# Patient Record
Sex: FEMALE | Race: WHITE | NOT HISPANIC OR LATINO | Employment: FULL TIME | RURAL
[De-identification: names, ages, dates, MRNs, and addresses within clinical notes are randomized per-mention and may not be internally consistent; named-entity substitution may affect disease eponyms.]

---

## 2017-01-25 ENCOUNTER — HISTORICAL (OUTPATIENT)
Dept: ADMINISTRATIVE | Facility: HOSPITAL | Age: 22
End: 2017-01-25

## 2017-01-27 LAB
LAB AP CLINICAL INFORMATION: NORMAL
LAB AP DIAGNOSIS - HISTORICAL: NORMAL
LAB AP GROSS PATHOLOGY - HISTORICAL: NORMAL
LAB AP SPECIMEN SUBMITTED - HISTORICAL: NORMAL

## 2018-04-23 ENCOUNTER — HISTORICAL (OUTPATIENT)
Dept: ADMINISTRATIVE | Facility: HOSPITAL | Age: 23
End: 2018-04-23

## 2018-04-25 LAB
LAB AP COMMENTS: NORMAL
LAB AP GENERAL CAT - HISTORICAL: NORMAL
LAB AP INTERPRETATION/RESULT - HISTORICAL: NORMAL
LAB AP SPECIMEN ADEQUACY - HISTORICAL: NORMAL
LAB AP SPECIMEN SUBMITTED - HISTORICAL: NORMAL

## 2023-12-08 ENCOUNTER — OFFICE VISIT (OUTPATIENT)
Dept: FAMILY MEDICINE | Facility: CLINIC | Age: 28
End: 2023-12-08
Payer: COMMERCIAL

## 2023-12-08 VITALS
BODY MASS INDEX: 32.32 KG/M2 | TEMPERATURE: 98 F | SYSTOLIC BLOOD PRESSURE: 116 MMHG | DIASTOLIC BLOOD PRESSURE: 64 MMHG | RESPIRATION RATE: 20 BRPM | HEART RATE: 98 BPM | OXYGEN SATURATION: 99 % | HEIGHT: 65 IN | WEIGHT: 194 LBS

## 2023-12-08 DIAGNOSIS — H65.02 NON-RECURRENT ACUTE SEROUS OTITIS MEDIA OF LEFT EAR: ICD-10-CM

## 2023-12-08 DIAGNOSIS — R59.1 LYMPHADENOPATHY OF HEAD AND NECK: Primary | ICD-10-CM

## 2023-12-08 PROCEDURE — 99202 PR OFFICE/OUTPT VISIT, NEW, LEVL II, 15-29 MIN: ICD-10-PCS | Mod: ,,,

## 2023-12-08 PROCEDURE — 99202 OFFICE O/P NEW SF 15 MIN: CPT | Mod: ,,,

## 2023-12-08 RX ORDER — VALACYCLOVIR HYDROCHLORIDE 1 G/1
1000 TABLET, FILM COATED ORAL 2 TIMES DAILY
COMMUNITY
Start: 2023-11-02

## 2023-12-08 NOTE — PROGRESS NOTES
Subjective     Patient ID: Cristal Smith is a 28 y.o. female.    Chief Complaint: Lymph Node Metastasis (Lymph node is located behind her ear closer to her neck on the left side, it is giving her pain. Started 3 days ago)    CHOCO is a 28 year old female that presents today for complaints of a swollen lymph node behind her left ear/neck. She states she noticed they lymph node 3 days ago. The lymph node is tender to touch. She has a history of genital HSV. She admits that she has had some oral lesions over the last couple of weeks. She has taken valtrex. She denies oral lesions at present. She denies sore throat, fever, bilateral ear pain or fullness, cough, nasal congestion, or sinus pressure. She denies other swollen lymph nodes.     Review of Systems   Constitutional:  Negative for chills, fatigue and fever.   HENT:  Negative for nasal congestion, ear pain, postnasal drip, rhinorrhea, sinus pressure/congestion, sneezing and sore throat.    Respiratory:  Negative for cough and shortness of breath.    Cardiovascular:  Negative for chest pain and palpitations.   Integumentary:  Negative for color change and pallor.   Hematological:  Positive for adenopathy.          Objective     Physical Exam  Vitals and nursing note reviewed.   Constitutional:       Appearance: Normal appearance. She is normal weight.   HENT:      Head: Normocephalic and atraumatic.      Right Ear: Tympanic membrane and ear canal normal.      Left Ear: A middle ear effusion is present.      Nose: Nose normal.      Mouth/Throat:      Mouth: Mucous membranes are moist.      Pharynx: Oropharynx is clear.   Eyes:      Extraocular Movements: Extraocular movements intact.      Conjunctiva/sclera: Conjunctivae normal.      Pupils: Pupils are equal, round, and reactive to light.   Cardiovascular:      Rate and Rhythm: Normal rate and regular rhythm.      Pulses: Normal pulses.      Heart sounds: Normal heart sounds.   Pulmonary:      Effort: Pulmonary effort  is normal.      Breath sounds: Normal breath sounds.   Musculoskeletal:         General: Normal range of motion.      Cervical back: Normal range of motion and neck supple.   Lymphadenopathy:      Head:      Left side of head: Posterior auricular adenopathy present. No submandibular adenopathy.      Cervical: Cervical adenopathy present.      Left cervical: Posterior cervical adenopathy present.      Upper Body:      Left upper body: No supraclavicular adenopathy.   Skin:     General: Skin is warm and dry.   Neurological:      General: No focal deficit present.      Mental Status: She is alert and oriented to person, place, and time.          Assessment and Plan     1. Lymphadenopathy of head and neck    2. Non-recurrent acute serous otitis media of left ear        Suspect viral etiology at this time  Increase PO fluid intake  Recommend nasal steroid and zyrtec  RTC if symptoms worsen or persist       No follow-ups on file.

## 2024-09-03 ENCOUNTER — OFFICE VISIT (OUTPATIENT)
Dept: FAMILY MEDICINE | Facility: CLINIC | Age: 29
End: 2024-09-03
Payer: COMMERCIAL

## 2024-09-03 VITALS
HEIGHT: 65 IN | WEIGHT: 182 LBS | SYSTOLIC BLOOD PRESSURE: 121 MMHG | BODY MASS INDEX: 30.32 KG/M2 | TEMPERATURE: 98 F | OXYGEN SATURATION: 100 % | HEART RATE: 78 BPM | DIASTOLIC BLOOD PRESSURE: 85 MMHG | RESPIRATION RATE: 20 BRPM

## 2024-09-03 DIAGNOSIS — F32.A ANXIETY AND DEPRESSION: Primary | ICD-10-CM

## 2024-09-03 DIAGNOSIS — R11.2 NAUSEA AND VOMITING, UNSPECIFIED VOMITING TYPE: ICD-10-CM

## 2024-09-03 DIAGNOSIS — R63.8 DECREASED ORAL INTAKE: ICD-10-CM

## 2024-09-03 DIAGNOSIS — F41.9 ANXIETY AND DEPRESSION: Primary | ICD-10-CM

## 2024-09-03 DIAGNOSIS — Z11.3 SCREEN FOR STD (SEXUALLY TRANSMITTED DISEASE): ICD-10-CM

## 2024-09-03 PROBLEM — Z02.9 ENCOUNTER FOR ADMINISTRATIVE EXAMINATIONS, UNSPECIFIED: Status: ACTIVE | Noted: 2024-08-27

## 2024-09-03 LAB
ALBUMIN SERPL BCP-MCNC: 4.4 G/DL (ref 3.5–5)
ALBUMIN/GLOB SERPL: 1.4 {RATIO}
ALP SERPL-CCNC: 78 U/L (ref 37–98)
ALT SERPL W P-5'-P-CCNC: 21 U/L (ref 13–56)
AMYLASE SERPL-CCNC: 45 U/L (ref 25–115)
ANION GAP SERPL CALCULATED.3IONS-SCNC: 11 MMOL/L (ref 7–16)
AST SERPL W P-5'-P-CCNC: 13 U/L (ref 15–37)
B-HCG UR QL: NEGATIVE
BASOPHILS # BLD AUTO: 0.07 K/UL (ref 0–0.2)
BASOPHILS NFR BLD AUTO: 0.7 % (ref 0–1)
BILIRUB SERPL-MCNC: 0.6 MG/DL (ref ?–1.2)
BILIRUB SERPL-MCNC: ABNORMAL MG/DL
BLOOD URINE, POC: NEGATIVE
BUN SERPL-MCNC: 8 MG/DL (ref 7–18)
BUN/CREAT SERPL: 10 (ref 6–20)
CALCIUM SERPL-MCNC: 9.2 MG/DL (ref 8.5–10.1)
CHLORIDE SERPL-SCNC: 109 MMOL/L (ref 98–107)
CLARITY, POC UA: ABNORMAL
CO2 SERPL-SCNC: 24 MMOL/L (ref 21–32)
COLOR, POC UA: ABNORMAL
CREAT SERPL-MCNC: 0.81 MG/DL (ref 0.55–1.02)
CTP QC/QA: YES
DIFFERENTIAL METHOD BLD: ABNORMAL
EGFR (NO RACE VARIABLE) (RUSH/TITUS): 102 ML/MIN/1.73M2
EOSINOPHIL # BLD AUTO: 0.19 K/UL (ref 0–0.5)
EOSINOPHIL NFR BLD AUTO: 1.9 % (ref 1–4)
ERYTHROCYTE [DISTWIDTH] IN BLOOD BY AUTOMATED COUNT: 12.3 % (ref 11.5–14.5)
GLOBULIN SER-MCNC: 3.2 G/DL (ref 2–4)
GLUCOSE SERPL-MCNC: 82 MG/DL (ref 74–106)
GLUCOSE UR QL STRIP: NEGATIVE
HCT VFR BLD AUTO: 42.2 % (ref 38–47)
HGB BLD-MCNC: 14.2 G/DL (ref 12–16)
HIV 1+O+2 AB SERPL QL: NORMAL
IMM GRANULOCYTES # BLD AUTO: 0.04 K/UL (ref 0–0.04)
IMM GRANULOCYTES NFR BLD: 0.4 % (ref 0–0.4)
KETONES UR QL STRIP: NEGATIVE
LEUKOCYTE ESTERASE URINE, POC: ABNORMAL
LIPASE SERPL-CCNC: 30 U/L (ref 16–77)
LYMPHOCYTES # BLD AUTO: 2.47 K/UL (ref 1–4.8)
LYMPHOCYTES NFR BLD AUTO: 24.3 % (ref 27–41)
MCH RBC QN AUTO: 30.9 PG (ref 27–31)
MCHC RBC AUTO-ENTMCNC: 33.6 G/DL (ref 32–36)
MCV RBC AUTO: 91.9 FL (ref 80–96)
MONOCYTES # BLD AUTO: 0.94 K/UL (ref 0–0.8)
MONOCYTES NFR BLD AUTO: 9.2 % (ref 2–6)
MPC BLD CALC-MCNC: 12.1 FL (ref 9.4–12.4)
NEUTROPHILS # BLD AUTO: 6.46 K/UL (ref 1.8–7.7)
NEUTROPHILS NFR BLD AUTO: 63.5 % (ref 53–65)
NITRITE, POC UA: NEGATIVE
NRBC # BLD AUTO: 0 X10E3/UL
NRBC, AUTO (.00): 0 %
PH, POC UA: 7
PLATELET # BLD AUTO: 246 K/UL (ref 150–400)
POTASSIUM SERPL-SCNC: 3.8 MMOL/L (ref 3.5–5.1)
PROT SERPL-MCNC: 7.6 G/DL (ref 6.4–8.2)
PROTEIN, POC: NEGATIVE
RBC # BLD AUTO: 4.59 M/UL (ref 4.2–5.4)
SODIUM SERPL-SCNC: 140 MMOL/L (ref 136–145)
SPECIFIC GRAVITY, POC UA: 1.02
SYPHILIS AB INTERPRETATION: NORMAL
UROBILINOGEN, POC UA: 1
WBC # BLD AUTO: 10.17 K/UL (ref 4.5–11)

## 2024-09-03 PROCEDURE — 99214 OFFICE O/P EST MOD 30 MIN: CPT | Mod: ,,, | Performed by: NURSE PRACTITIONER

## 2024-09-03 PROCEDURE — 85025 COMPLETE CBC W/AUTO DIFF WBC: CPT | Mod: ,,, | Performed by: CLINICAL MEDICAL LABORATORY

## 2024-09-03 PROCEDURE — 87661 TRICHOMONAS VAGINALIS AMPLIF: CPT | Mod: ,,, | Performed by: CLINICAL MEDICAL LABORATORY

## 2024-09-03 PROCEDURE — 82150 ASSAY OF AMYLASE: CPT | Mod: ,,, | Performed by: CLINICAL MEDICAL LABORATORY

## 2024-09-03 PROCEDURE — 86695 HERPES SIMPLEX TYPE 1 TEST: CPT | Mod: ,,, | Performed by: CLINICAL MEDICAL LABORATORY

## 2024-09-03 PROCEDURE — 87591 N.GONORRHOEAE DNA AMP PROB: CPT | Mod: ,,, | Performed by: CLINICAL MEDICAL LABORATORY

## 2024-09-03 PROCEDURE — 81025 URINE PREGNANCY TEST: CPT | Mod: QW,,, | Performed by: NURSE PRACTITIONER

## 2024-09-03 PROCEDURE — 81003 URINALYSIS AUTO W/O SCOPE: CPT | Mod: QW,,, | Performed by: NURSE PRACTITIONER

## 2024-09-03 PROCEDURE — 86696 HERPES SIMPLEX TYPE 2 TEST: CPT | Mod: ,,, | Performed by: CLINICAL MEDICAL LABORATORY

## 2024-09-03 PROCEDURE — 80053 COMPREHEN METABOLIC PANEL: CPT | Mod: ,,, | Performed by: CLINICAL MEDICAL LABORATORY

## 2024-09-03 PROCEDURE — 87389 HIV-1 AG W/HIV-1&-2 AB AG IA: CPT | Mod: ,,, | Performed by: CLINICAL MEDICAL LABORATORY

## 2024-09-03 PROCEDURE — 86780 TREPONEMA PALLIDUM: CPT | Mod: ,,, | Performed by: CLINICAL MEDICAL LABORATORY

## 2024-09-03 PROCEDURE — 87491 CHLMYD TRACH DNA AMP PROBE: CPT | Mod: ,,, | Performed by: CLINICAL MEDICAL LABORATORY

## 2024-09-03 PROCEDURE — 83690 ASSAY OF LIPASE: CPT | Mod: ,,, | Performed by: CLINICAL MEDICAL LABORATORY

## 2024-09-03 RX ORDER — TRIAMCINOLONE ACETONIDE 1 MG/G
CREAM TOPICAL 2 TIMES DAILY
COMMUNITY
Start: 2024-07-08

## 2024-09-03 RX ORDER — FLUOXETINE HYDROCHLORIDE 20 MG/1
20 CAPSULE ORAL DAILY
Qty: 30 CAPSULE | Refills: 11 | Status: SHIPPED | OUTPATIENT
Start: 2024-09-03 | End: 2024-09-03

## 2024-09-03 RX ORDER — PIMECROLIMUS 10 MG/G
CREAM TOPICAL 2 TIMES DAILY
COMMUNITY
Start: 2024-08-20

## 2024-09-03 RX ORDER — CLINDAMYCIN PHOSPHATE 10 UG/ML
LOTION TOPICAL
COMMUNITY
Start: 2024-05-21

## 2024-09-03 RX ORDER — HYDROXYZINE PAMOATE 25 MG/1
CAPSULE ORAL
Qty: 60 CAPSULE | Refills: 1 | Status: SHIPPED | OUTPATIENT
Start: 2024-09-03

## 2024-09-03 NOTE — PROGRESS NOTES
"Subjective:       Patient ID: Cristal Smith is a 29 y.o. female.    Chief Complaint: Nausea and Anxiety (ONSET:  POST LAST WEEK.  STATES SHE FEELS DEPRESSED.  )    Presents to clinic as above.  States boyfriend  last Monday after having a valve replace in heart. She has been upset and vomiting since then. Not really eating well. Not sleeping well. Feeling anxious and depressed. Has vomited several times per day since then. She is drinking fluids and voiding. She denies feeling suicidal or homicidal. Also wants an STD screen.    Review of Systems   Constitutional: Negative.    Respiratory: Negative.     Cardiovascular: Negative.    Gastrointestinal:  Positive for nausea and vomiting. Negative for abdominal pain.   Psychiatric/Behavioral:  Positive for depression. Negative for hallucinations, memory loss, substance abuse and suicidal ideas. The patient is nervous/anxious and has insomnia.           Reviewed family, medical, surgical, and social history.    Objective:      /85 (BP Location: Left arm, Patient Position: Sitting, BP Method: Large (Automatic))   Pulse 78   Temp 98.2 °F (36.8 °C)   Resp 20   Ht 5' 5" (1.651 m)   Wt 82.6 kg (182 lb)   LMP 2024   SpO2 100%   BMI 30.29 kg/m²   Physical Exam  Vitals and nursing note reviewed.   Constitutional:       General: She is not in acute distress.     Appearance: Normal appearance. She is not ill-appearing, toxic-appearing or diaphoretic.   HENT:      Head: Normocephalic.      Mouth/Throat:      Mouth: Mucous membranes are moist.   Cardiovascular:      Rate and Rhythm: Normal rate and regular rhythm.      Heart sounds: Normal heart sounds.   Pulmonary:      Effort: Pulmonary effort is normal.      Breath sounds: Normal breath sounds.   Abdominal:      General: Abdomen is flat. Bowel sounds are normal. There is no distension.      Palpations: There is no mass.      Tenderness: There is no abdominal tenderness. There is no right CVA tenderness, left " CVA tenderness, guarding or rebound.      Hernia: No hernia is present.   Musculoskeletal:      Cervical back: Normal range of motion and neck supple.   Skin:     General: Skin is warm and dry.      Capillary Refill: Capillary refill takes less than 2 seconds.   Neurological:      Mental Status: She is alert and oriented to person, place, and time.   Psychiatric:         Mood and Affect: Mood normal.         Behavior: Behavior normal.         Thought Content: Thought content normal.         Judgment: Judgment normal.            Office Visit on 09/03/2024   Component Date Value Ref Range Status    POC Preg Test, Ur 09/03/2024 Negative  Negative Final     Acceptable 09/03/2024 Yes   Final    Color, UA 09/03/2024 Dark Yellow   Final    Clarity, UA 09/03/2024 Slightly Cloudy   Final    Spec Grav UA 09/03/2024 1.020   Final    pH, UA 09/03/2024 7.0   Final    WBC, UA 09/03/2024 small   Final    Nitrite, UA 09/03/2024 negative   Final    Protein, POC 09/03/2024 negative   Final    Glucose, UA 09/03/2024 negative   Final    Ketones, UA 09/03/2024 negative   Final    Bilirubin, POC 09/03/2024 small   Final    Urobilinogen, UA 09/03/2024 1.0   Final    Blood, UA 09/03/2024 negative   Final      Assessment:       1. Anxiety and depression    2. Decreased oral intake    3. Nausea and vomiting, unspecified vomiting type    4. Screen for STD (sexually transmitted disease)        Plan:       Anxiety and depression  -     Discontinue: FLUoxetine 20 MG capsule; Take 1 capsule (20 mg total) by mouth once daily.  Dispense: 30 capsule; Refill: 11  -     hydrOXYzine pamoate (VISTARIL) 25 MG Cap; Take 1-2 po q 6 hours prn anxiety  Dispense: 60 capsule; Refill: 1    Decreased oral intake  -     POCT urine pregnancy  -     POCT URINALYSIS W/O SCOPE    Nausea and vomiting, unspecified vomiting type  -     CBC Auto Differential; Future; Expected date: 09/03/2024  -     Comprehensive Metabolic Panel; Future; Expected date:  09/03/2024  -     Amylase; Future; Expected date: 09/03/2024  -     Lipase; Future; Expected date: 09/03/2024    Screen for STD (sexually transmitted disease)  -     Chlamydia/GC, PCR  -     Trichomonas vaginalis by PCR  -     HIV 1/2 Ag/Ab (4th Gen); Future; Expected date: 09/03/2024  -     Syphilis Antibody with reflex to RPR; Future; Expected date: 09/03/2024  -     HSV 1 & 2, IgG; Future; Expected date: 09/03/2024    Does not want prozac. So, it was discontinued  I will call with lab results  Go to ER if vomiting does not stop  Take a dose of vistaril as soon as you pick it up from pharmacy (ASAP). One hour after taking vistaril, start sipping Gatorade slowly (1-2 tsp) every 15 min for an hour. If holds this down, you can continue to go up on intake slowly. Advance diet to bland foods when tolerating liquids (dry toast, crackers, soup, grits, mashed potatoes without gravy.   Go to ER if vomiting does not stop or new symptoms.            Risks, benefits, and side effects were discussed with the patient. All questions were answered to the fullest satisfaction of the patient, and pt verbalized understanding and agreement to treatment plan. Pt was to call with any new or worsening symptoms, or present to the ER.

## 2024-09-03 NOTE — LETTER
September 3, 2024      [facility]  [address] [place] [address]  Phone: [phone]  Fax: [phone]       Patient: Cristal Smith   YOB: 1995  Date of Visit: 09/03/2024    To Whom It May Concern:    Cole Smith  was at [facility] on 09/03/2024. The patient may return to work/school on 09/04/2024 with no restrictions. If you have any questions or concerns, or if I can be of further assistance, please do not hesitate to contact me.    Sincerely,    Brennan Little, CMA

## 2024-09-04 LAB
CHLAMYDIA BY PCR: NEGATIVE
N. GONORRHOEAE (GC) BY PCR: NEGATIVE
TRICHOMONAS NAT: NEGATIVE

## 2024-09-05 ENCOUNTER — OFFICE VISIT (OUTPATIENT)
Dept: FAMILY MEDICINE | Facility: CLINIC | Age: 29
End: 2024-09-05
Payer: COMMERCIAL

## 2024-09-05 VITALS
WEIGHT: 182 LBS | OXYGEN SATURATION: 98 % | HEART RATE: 93 BPM | HEIGHT: 65 IN | RESPIRATION RATE: 18 BRPM | TEMPERATURE: 99 F | DIASTOLIC BLOOD PRESSURE: 81 MMHG | BODY MASS INDEX: 30.32 KG/M2 | SYSTOLIC BLOOD PRESSURE: 119 MMHG

## 2024-09-05 DIAGNOSIS — R11.2 NAUSEA AND VOMITING, UNSPECIFIED VOMITING TYPE: ICD-10-CM

## 2024-09-05 DIAGNOSIS — F43.21 GRIEF: ICD-10-CM

## 2024-09-05 DIAGNOSIS — F41.9 ANXIETY: Primary | ICD-10-CM

## 2024-09-05 DIAGNOSIS — F32.A DEPRESSION, UNSPECIFIED DEPRESSION TYPE: ICD-10-CM

## 2024-09-05 LAB
HSV TYPE 1 AB IGG INDEX: 1.33
HSV TYPE 2 AB IGG INDEX: 3.78
HSV1 IGG SER QL: POSITIVE
HSV2 IGG SER QL: POSITIVE

## 2024-09-05 RX ORDER — TRAZODONE HYDROCHLORIDE 50 MG/1
50 TABLET ORAL NIGHTLY
Qty: 30 TABLET | Refills: 11 | Status: SHIPPED | OUTPATIENT
Start: 2024-09-05 | End: 2024-09-06 | Stop reason: SINTOL

## 2024-09-05 RX ORDER — ONDANSETRON 4 MG/1
8 TABLET, ORALLY DISINTEGRATING ORAL EVERY 8 HOURS PRN
Qty: 30 TABLET | Refills: 0 | Status: SHIPPED | OUTPATIENT
Start: 2024-09-05 | End: 2024-09-15

## 2024-09-05 NOTE — PROGRESS NOTES
"    Faina RODRIGUEZA  [address], [place] [address]  Phone: [phone]     Subjective     Name: Cristal Smith   YOB: 1995 (29 y.o.)  MRN: [id number]  Visit Date: 9/5/2024   Chief Complaint: Anxiety (Nausea and vomiting since her boyfriend passed away), Gastroesophageal Reflux, Depression (Does not want any antidepressants, reports that she cannot sleep. ), and Health Maintenance (Has had a patch test for her allergies )        HISTORY OF PRESENT ILLNESS:  Patient is a 30 y/o F who presents with nausea, vomiting and anxiety. Pt loss her significant other last week. She was very tearful on exam but denies suicidal ideation. Since that time she has nausea and vomiting. She has 4-12 episodes per day. She has not been able to tolerate much food. She has some mild abdominal pain. She denies any new foods, URI symptoms or pregnancy. She believes it is related to her anxiety. She endorses "shear fear", shaking and palpitations. She does not tolerate the SSRI's well. She was prescribed hydroxyzine but states that it has not been effective. Pt has been appointment with Alisha Oliveira NP at [facility] on 10/05/2024. She is requesting treatment for her anxiety until she has her appt.           PAST MEDICAL HISTORY:  Significant Diagnoses - Patient  has no past medical history on file.  Medications - Patient has a current medication list which includes the following long-term medication(s): clindamycin, triamcinolone acetonide 0.1%, and alprazolam.   Allergies - Patient is allergic to sulfa (sulfonamide antibiotics); neomycin; and latex, natural rubber.  Surgeries - Patient  has no past surgical history on file.  Family History - Patient family history is not on file.      SOCIAL HISTORY:  Tobacco - Patient  reports that she has been smoking cigarettes. She has never used smokeless tobacco.   Alcohol - Patient  reports no history of alcohol use.   Recreational Drugs - Patient  reports " "no history of drug use.       Review of Systems   Constitutional:  Negative for appetite change and fever.   HENT: Negative.     Respiratory:  Negative for shortness of breath.    Cardiovascular:  Positive for palpitations. Negative for chest pain.   Gastrointestinal:  Positive for abdominal pain, nausea and vomiting. Negative for diarrhea.   Psychiatric/Behavioral:  Positive for depressed mood and sleep disturbance. The patient is nervous/anxious.           No past medical history on file.     Review of patient's allergies indicates:   Allergen Reactions    Sulfa (sulfonamide antibiotics) Rash    Neomycin Hives    Latex, natural rubber Rash        No past surgical history on file.     No family history on file.    Current Outpatient Medications   Medication Instructions    ALPRAZolam (XANAX) 0.25 mg, Oral, 2 times daily PRN    clindamycin (CLEOCIN T) 1 % lotion Topical (Top)    hydrOXYzine pamoate (VISTARIL) 25 MG Cap Take 1-2 po q 6 hours prn anxiety    ondansetron (ZOFRAN-ODT) 8 mg, Oral, Every 8 hours PRN    pimecrolimus (ELIDEL) 1 % cream Topical (Top), 2 times daily    triamcinolone acetonide 0.1% (KENALOG) 0.1 % cream Topical (Top), 2 times daily        Objective     /81 (BP Location: Left arm, Patient Position: Sitting, BP Method: Medium (Automatic))   Pulse 93   Temp 98.6 °F (37 °C) (Oral)   Resp 18   Ht 5' 5" (1.651 m)   Wt 82.6 kg (182 lb)   LMP 08/19/2024   SpO2 98%   BMI 30.29 kg/m²     Physical Exam  Constitutional:       Appearance: Normal appearance.   HENT:      Head: Normocephalic and atraumatic.      Nose: Nose normal.      Mouth/Throat:      Mouth: Mucous membranes are moist.      Pharynx: Posterior oropharyngeal erythema present.   Eyes:      Conjunctiva/sclera: Conjunctivae normal.      Pupils: Pupils are equal, round, and reactive to light.   Cardiovascular:      Rate and Rhythm: Normal rate.      Pulses: Normal pulses.   Pulmonary:      Effort: Pulmonary effort is normal. No " respiratory distress.      Breath sounds: Normal breath sounds.   Abdominal:      General: Bowel sounds are normal.      Palpations: Abdomen is soft.      Tenderness: There is no abdominal tenderness.   Musculoskeletal:         General: Normal range of motion.   Skin:     General: Skin is warm and dry.      Capillary Refill: Capillary refill takes less than 2 seconds.   Neurological:      Mental Status: She is alert.   Psychiatric:         Mood and Affect: Affect is tearful.          All recently obtained labs have been reviewed and discussed in detail with the patient.   Assessment     1. Anxiety    2. Nausea and vomiting, unspecified vomiting type    3. Grief    4. Depression, unspecified depression type         Plan     Problem List Items Addressed This Visit          Psychiatric    Anxiety - Primary     H/o panic attacks and anxiety  Pt endorses anxiety with shear fear, palpitations and shaking  -hydroxyzine 25 mg has not resolved symptoms  -Pt did not tolerate SSRI previously  Start trazadone 50 mg, 1 tab Qhs   Follow up in 1 week   Follow up with Alisha Oliveira NP at [facility] on 10/05/2024             GI    Nausea and vomiting     Nausea and emesis, since last week. Emesis 4-12x per day  Pt denies fever, URI symptoms. She believes it is related to anxiety  -erythematous pharynx, BS+, soft abdomen  Start zofran 8 mg, 2 tabs PO Q8H PRN  Follow up in 1 week         Relevant Medications    ondansetron (ZOFRAN-ODT) 4 MG TbDL     Other Visit Diagnoses       Grief        Depression, unspecified depression type                  All orders:  Orders Placed This Encounter    ondansetron (ZOFRAN-ODT) 4 MG TbDL          Follow up in about 1 week (around 9/12/2024).    Faina RODRIGUEZA  Formerly [facility], 1928–1983

## 2024-09-06 DIAGNOSIS — F41.9 ANXIETY: Primary | ICD-10-CM

## 2024-09-06 RX ORDER — ALPRAZOLAM 0.25 MG/1
0.25 TABLET ORAL 2 TIMES DAILY PRN
Qty: 10 TABLET | Refills: 0 | Status: SHIPPED | OUTPATIENT
Start: 2024-09-06 | End: 2024-10-06

## 2024-09-06 NOTE — PROGRESS NOTES
Pt has endorsed panic attacks, anxiety with nausea & vomiting. Pt states that her panic attacks mostly wake her up at night and she is unable to resume sleeping.  Initially prescribed trazadone but it resulted in a panic attack per the patient. Inquired about multiple medications which would not interfere with her zofran. Buspar was suggested with a decreased use of zofran however the pt eats grapefruit often. Pt was later prescribed xanax 0.25 mg for her panic attacks. Trazadone is discontinued

## 2024-09-13 ENCOUNTER — OFFICE VISIT (OUTPATIENT)
Dept: FAMILY MEDICINE | Facility: CLINIC | Age: 29
End: 2024-09-13
Payer: COMMERCIAL

## 2024-09-13 VITALS
TEMPERATURE: 98 F | OXYGEN SATURATION: 96 % | HEART RATE: 109 BPM | BODY MASS INDEX: 30.16 KG/M2 | WEIGHT: 181 LBS | SYSTOLIC BLOOD PRESSURE: 107 MMHG | RESPIRATION RATE: 18 BRPM | HEIGHT: 65 IN | DIASTOLIC BLOOD PRESSURE: 68 MMHG

## 2024-09-13 DIAGNOSIS — Z72.51 HIGH RISK HETEROSEXUAL BEHAVIOR: ICD-10-CM

## 2024-09-13 DIAGNOSIS — Z13.220 ENCOUNTER FOR SCREENING FOR LIPOID DISORDERS: ICD-10-CM

## 2024-09-13 DIAGNOSIS — R11.2 NAUSEA AND VOMITING, UNSPECIFIED VOMITING TYPE: ICD-10-CM

## 2024-09-13 DIAGNOSIS — F41.9 ANXIETY: Primary | ICD-10-CM

## 2024-09-13 LAB
CHOLEST SERPL-MCNC: 135 MG/DL (ref 0–200)
CHOLEST/HDLC SERPL: 3.5 {RATIO}
HCV AB SER QL: NORMAL
HDLC SERPL-MCNC: 39 MG/DL (ref 40–60)
LDLC SERPL CALC-MCNC: 58 MG/DL
LDLC/HDLC SERPL: 1.5 {RATIO}
NONHDLC SERPL-MCNC: 96 MG/DL
TRIGL SERPL-MCNC: 189 MG/DL (ref 35–150)
VLDLC SERPL-MCNC: 38 MG/DL

## 2024-09-13 PROCEDURE — 99213 OFFICE O/P EST LOW 20 MIN: CPT | Mod: GC,,, | Performed by: FAMILY MEDICINE

## 2024-09-13 PROCEDURE — 86803 HEPATITIS C AB TEST: CPT | Mod: ,,, | Performed by: CLINICAL MEDICAL LABORATORY

## 2024-09-13 PROCEDURE — 80061 LIPID PANEL: CPT | Mod: ,,, | Performed by: CLINICAL MEDICAL LABORATORY

## 2024-09-13 NOTE — PROGRESS NOTES
Faina Ponce MD [facility]  [address], [place] [address]  Phone: [phone]     Subjective     Name: Cristal Smith   YOB: 1995 (29 y.o.)  MRN: [id number]  Visit Date: 9/13/2024   Chief Complaint: Sinus Problem (Room 4 1 week f/u re anxiety, c/o sinus drainage and hoarseness)        HISTORY OF PRESENT ILLNESS:  Patient is a 28 y/o F who presents for follow up. Pt endorsed significant anxiety, nausea and vomiting after the loss of boyfriend. Pt states that her nausea and vomiting have improved since last week. The zofran has been effective and she has decreased it TID to BID. She states her appetite has returned. She reports coughing up a piece of tissue. She has hoarseness, which was evident on exam. The significant emesis is the likely cause of her hoarseness and should improve.  Pt was initially prescribed trazadone for anxiety but  pt reported that it induced a panic attack. She was prescribed a low dose of alprazolam. Patient states the treatment is minimally effective. She has only had 1 panic attack. She states the medicine took longer than expected to work. Her appointment with [facility] is on 10/05/2024. Pt requested some STI testing due to her previous relationship. She has been counseled on safe sex practices.           PAST MEDICAL HISTORY:  Significant Diagnoses - Patient  has no past medical history on file.  Medications - Patient has a current medication list which includes the following long-term medication(s): alprazolam, clindamycin, and triamcinolone acetonide 0.1%.   Allergies - Patient is allergic to sulfa (sulfonamide antibiotics); neomycin; and latex, natural rubber.  Surgeries - Patient  has no past surgical history on file.  Family History - Patient family history is not on file.      SOCIAL HISTORY:  Tobacco - Patient  reports that she has been smoking cigarettes. She has never used smokeless tobacco.   Alcohol - Patient  reports no history of alcohol  "use.   Recreational Drugs - Patient  reports no history of drug use.       Review of Systems   Constitutional:  Negative for appetite change and fever.   HENT:  Positive for sore throat.    Respiratory:  Negative for shortness of breath.    Cardiovascular:  Positive for palpitations. Negative for chest pain.   Gastrointestinal:  Negative for abdominal pain, diarrhea, nausea and vomiting.   Psychiatric/Behavioral:  Positive for sleep disturbance. Negative for depressed mood. The patient is nervous/anxious.           History reviewed. No pertinent past medical history.     Review of patient's allergies indicates:   Allergen Reactions    Sulfa (sulfonamide antibiotics) Rash    Neomycin Hives    Latex, natural rubber Rash        History reviewed. No pertinent surgical history.     History reviewed. No pertinent family history.    Current Outpatient Medications   Medication Instructions    ALPRAZolam (XANAX) 0.25 mg, Oral, 2 times daily PRN    clindamycin (CLEOCIN T) 1 % lotion Apply topically.    pimecrolimus (ELIDEL) 1 % cream 2 times daily    triamcinolone acetonide 0.1% (KENALOG) 0.1 % cream 2 times daily        Objective     /68 (BP Location: Left arm, Patient Position: Sitting, BP Method: Medium (Automatic))   Pulse 109   Temp 98.1 °F (36.7 °C) (Oral)   Resp 18   Ht 5' 5" (1.651 m)   Wt 82.1 kg (181 lb)   LMP 08/19/2024   SpO2 96%   BMI 30.12 kg/m²     Physical Exam  Constitutional:       Appearance: Normal appearance.   HENT:      Mouth/Throat:      Pharynx: Posterior oropharyngeal erythema present.   Eyes:      Conjunctiva/sclera: Conjunctivae normal.   Cardiovascular:      Rate and Rhythm: Tachycardia present.   Pulmonary:      Effort: Pulmonary effort is normal. No respiratory distress.   Abdominal:      Palpations: Abdomen is soft.   Skin:     General: Skin is warm and dry.      Capillary Refill: Capillary refill takes less than 2 seconds.   Neurological:      Mental Status: She is alert. "   Psychiatric:         Mood and Affect: Mood normal.          All recently obtained labs have been reviewed and discussed in detail with the patient.   Assessment     1. Anxiety    2. Nausea and vomiting, unspecified vomiting type    3. Encounter for screening for lipoid disorders    4. High risk heterosexual behavior         Plan     Problem List Items Addressed This Visit          Psychiatric    Anxiety - Primary     H/o panic attacks and anxiety  Pt endorses anxiety with shear fear, palpitations and shaking  Prescribed trazadone which induced panic attack  Trazadone discontinued   Started on alprazolam 0.25 mg BID PRN-->minimal effect  Follow up in 10/15/2024  Follow up with Alisha Oliveira NP at [facility] on 10/05/2024             GI    Nausea and vomiting     Nausea and emesis resolve with zofran  .She believes it is related to anxiety  -erythematous pharynx, BS+, soft abdomen  Her need for zofran has decreased from TID to BID  Continue  zofran 8 mg, 2 tabs PO Q8H PRN  Follow up on 10/15/2024          Other Visit Diagnoses       Encounter for screening for lipoid disorders        Relevant Orders    Lipid Panel (Completed)    High risk heterosexual behavior        Relevant Orders    Hepatitis C Antibody (Completed)              All orders:  Orders Placed This Encounter    Hepatitis C Antibody    Lipid Panel          Follow up in about 1 month (around 10/15/2024).    Faina RODRIGUEZA   [facility]

## 2024-09-16 NOTE — PROGRESS NOTES
Triglycerides are borderline high. Her cholestrol are NL and her LDL is at goal. Hep C is is nonreactive

## 2024-09-16 NOTE — ASSESSMENT & PLAN NOTE
H/o panic attacks and anxiety  Pt endorses anxiety with shear fear, palpitations and shaking  Prescribed trazadone which induced panic attack  Trazadone discontinued   Started on alprazolam 0.25 mg BID PRN-->minimal effect  Follow up in 10/15/2024  Follow up with Alisha Oliveira NP at [facility] on 10/05/2024

## 2024-09-16 NOTE — ASSESSMENT & PLAN NOTE
Nausea and emesis resolve with zofran  .She believes it is related to anxiety  -erythematous pharynx, BS+, soft abdomen  Her need for zofran has decreased from TID to BID  Continue  zofran 8 mg, 2 tabs PO Q8H PRN  Follow up on 10/15/2024

## 2024-11-11 ENCOUNTER — OFFICE VISIT (OUTPATIENT)
Dept: FAMILY MEDICINE | Facility: CLINIC | Age: 29
End: 2024-11-11
Payer: COMMERCIAL

## 2024-11-11 VITALS — HEIGHT: 65 IN | BODY MASS INDEX: 29.99 KG/M2 | WEIGHT: 180 LBS

## 2024-11-11 DIAGNOSIS — N30.90 CYSTITIS: ICD-10-CM

## 2024-11-11 DIAGNOSIS — Z20.2 POSSIBLE EXPOSURE TO STI: ICD-10-CM

## 2024-11-11 DIAGNOSIS — N89.8 VAGINAL DISCHARGE: Primary | ICD-10-CM

## 2024-11-11 LAB
BILIRUB SERPL-MCNC: NEGATIVE MG/DL
BLOOD URINE, POC: NEGATIVE
CANDIDA SPECIES: POSITIVE
CLARITY, UA: CLEAR
COLOR, UA: YELLOW
GARDNERELLA: NEGATIVE
GLUCOSE UR QL STRIP: NEGATIVE
KETONES UR QL STRIP: NEGATIVE
LEUKOCYTE ESTERASE URINE, POC: ABNORMAL
NITRITE, POC UA: NEGATIVE
PH, POC UA: 6
PROTEIN, POC: NEGATIVE
SPECIFIC GRAVITY, POC UA: <=1.005
TRICHOMONAS: NEGATIVE
UROBILINOGEN, POC UA: 0.2

## 2024-11-11 PROCEDURE — 87491 CHLMYD TRACH DNA AMP PROBE: CPT | Mod: ,,, | Performed by: CLINICAL MEDICAL LABORATORY

## 2024-11-11 PROCEDURE — 87660 TRICHOMONAS VAGIN DIR PROBE: CPT | Mod: 59,,, | Performed by: CLINICAL MEDICAL LABORATORY

## 2024-11-11 PROCEDURE — 81003 URINALYSIS AUTO W/O SCOPE: CPT | Mod: QW,,, | Performed by: NURSE PRACTITIONER

## 2024-11-11 PROCEDURE — 87661 TRICHOMONAS VAGINALIS AMPLIF: CPT | Mod: ,,, | Performed by: CLINICAL MEDICAL LABORATORY

## 2024-11-11 PROCEDURE — 87480 CANDIDA DNA DIR PROBE: CPT | Mod: ,,, | Performed by: CLINICAL MEDICAL LABORATORY

## 2024-11-11 PROCEDURE — 87510 GARDNER VAG DNA DIR PROBE: CPT | Mod: ,,, | Performed by: CLINICAL MEDICAL LABORATORY

## 2024-11-11 PROCEDURE — 99213 OFFICE O/P EST LOW 20 MIN: CPT | Mod: ,,, | Performed by: NURSE PRACTITIONER

## 2024-11-11 PROCEDURE — 87591 N.GONORRHOEAE DNA AMP PROB: CPT | Mod: ,,, | Performed by: CLINICAL MEDICAL LABORATORY

## 2024-11-11 RX ORDER — NITROFURANTOIN 25; 75 MG/1; MG/1
100 CAPSULE ORAL 2 TIMES DAILY
Qty: 14 CAPSULE | Refills: 0 | Status: SHIPPED | OUTPATIENT
Start: 2024-11-11

## 2024-11-11 RX ORDER — PHENAZOPYRIDINE HYDROCHLORIDE 100 MG/1
100 TABLET, FILM COATED ORAL 3 TIMES DAILY PRN
Qty: 9 TABLET | Refills: 0 | Status: SHIPPED | OUTPATIENT
Start: 2024-11-11 | End: 2024-11-21

## 2024-11-11 RX ORDER — ALPRAZOLAM 1 MG/1
1 TABLET ORAL DAILY
COMMUNITY
Start: 2024-10-08

## 2024-11-11 NOTE — PROGRESS NOTES
[facility]  Chief Complaint      Chief Complaint   Patient presents with    Urinary Tract Infection     Burning/discomfort when urinating. Greenish discharge, foul odor. Pelvic discomfort while sitting. ONSET- x1wk       History of Present Illness      Cristal Smith is a 29 y.o. female  who presents today for reports of dysuria, pelvic pressure, and vaginal discharge with malodor. Onset of symptoms 1 week ago. The pt reports taking OTC Azo with mild relief of symptoms. Denies fever or chills.     Dysuria   This is a new problem. The current episode started in the past 7 days. The problem occurs every urination. The problem has been rapidly worsening. The quality of the pain is described as aching, burning and stabbing. The pain is at a severity of 5/10. The pain is moderate. There has been no fever. The fever has been present for Less than 1 day. She is Sexually active. There is A history of pyelonephritis. Associated symptoms include a discharge, flank pain, frequency, hesitancy, nausea, a possible pregnancy, urgency, weight loss and withholding. Pertinent negatives include no behavior changes, chills, sweats, vomiting, constipation or rash. She has tried home medications and increased fluids for the symptoms. The treatment provided no relief. Her past medical history is significant for STD. There is no history of catheterization, diabetes insipidus, diabetes mellitus, genitourinary reflux, hypertension, kidney stones, recurrent UTIs, a single kidney, urinary stasis or a urological procedure.      Past Medical History:  History reviewed. No pertinent past medical history.    Past Surgical History:   has no past surgical history on file.    Social History:  Social History     Tobacco Use    Smoking status: Every Day     Current packs/day: 1.00     Types: Cigarettes    Smokeless tobacco: Never   Substance Use Topics    Alcohol use: Never    Drug use: Never       I personally reviewed all past  "medical, surgical, and social.     Review of Systems   Constitutional:  Positive for weight loss. Negative for chills, fatigue, fever and unexpected weight change.   Respiratory:  Negative for cough and shortness of breath.    Cardiovascular:  Negative for chest pain and leg swelling.   Gastrointestinal:  Positive for nausea. Negative for abdominal pain, constipation, diarrhea and vomiting.   Genitourinary:  Positive for dysuria, flank pain, frequency, hesitancy and urgency. Negative for difficulty urinating.   Musculoskeletal:  Negative for arthralgias and myalgias.   Skin:  Negative for color change and rash.   Neurological:  Negative for dizziness, syncope, weakness and headaches.      Medications:  Outpatient Encounter Medications as of 11/11/2024   Medication Sig Dispense Refill    ALPRAZolam (XANAX) 0.25 MG tablet Take 1 tablet (0.25 mg total) by mouth 2 (two) times daily as needed for Anxiety (only for panic attacks). 10 tablet 0    ALPRAZolam (XANAX) 1 MG tablet Take 1 mg by mouth once daily.      clindamycin (CLEOCIN T) 1 % lotion Apply topically. (Patient not taking: Reported on 11/11/2024)      pimecrolimus (ELIDEL) 1 % cream Apply topically 2 (two) times daily. (Patient not taking: Reported on 11/11/2024)      triamcinolone acetonide 0.1% (KENALOG) 0.1 % cream Apply topically 2 (two) times daily. (Patient not taking: Reported on 11/11/2024)       No facility-administered encounter medications on file as of 11/11/2024.       Allergies:  Review of patient's allergies indicates:   Allergen Reactions    Sulfa (sulfonamide antibiotics) Rash    Neomycin Hives    Latex, natural rubber Rash       Health Maintenance:    There is no immunization history on file for this patient.     Health Maintenance   Topic Date Due    TETANUS VACCINE  Never done    Pap Smear  04/23/2021    Hepatitis C Screening  Completed    Lipid Panel  Completed        Physical Exam      Height and Weight  Height: 5' 5" (165.1 cm)  Weight: " 81.6 kg (180 lb)  BSA (Calculated - sq m): 1.93 sq meters  BMI (Calculated): 30  Weight in (lb) to have BMI = 25: 149.9]    Physical Exam  Constitutional:       Appearance: Normal appearance.   HENT:      Head: Normocephalic.      Mouth/Throat:      Mouth: Mucous membranes are moist.   Cardiovascular:      Rate and Rhythm: Normal rate and regular rhythm.      Pulses: Normal pulses.      Heart sounds: Normal heart sounds.   Pulmonary:      Effort: Pulmonary effort is normal. No respiratory distress.      Breath sounds: Normal breath sounds.   Abdominal:      General: There is no distension.      Tenderness: There is abdominal tenderness in the suprapubic area. There is no right CVA tenderness, left CVA tenderness, guarding or rebound.   Musculoskeletal:         General: No swelling, tenderness, deformity or signs of injury. Normal range of motion.      Cervical back: Neck supple.      Right lower leg: No edema.      Left lower leg: No edema.   Skin:     General: Skin is warm and dry.   Neurological:      Mental Status: She is alert and oriented to person, place, and time.   Psychiatric:         Mood and Affect: Mood normal.         Behavior: Behavior normal.         Thought Content: Thought content normal.         Judgment: Judgment normal.        Laboratory:  CBC:  Recent Labs   Lab 09/03/24  1305   WBC 10.17   RBC 4.59   Hemoglobin 14.2   Hematocrit 42.2   Platelet Count 246   MCV 91.9   MCH 30.9   MCHC 33.6       LIPIDS:  Recent Labs   Lab 09/13/24  1522   HDL Cholesterol 39 L   Cholesterol 135   Triglycerides 189 H   LDL Calculated 58   Cholesterol/HDL Ratio (Risk Factor) 3.5   Non-HDL 96       TSH:        A1C:        Lab Results   Component Value Date    GLU 82 09/03/2024     09/03/2024    K 3.8 09/03/2024     (H) 09/03/2024    CO2 24 09/03/2024    BUN 8 09/03/2024    CREATININE 0.81 09/03/2024    CALCIUM 9.2 09/03/2024    PROT 7.6 09/03/2024    ALBUMIN 4.4 09/03/2024    BILITOT 0.6 09/03/2024     "ALKPHOS 78 09/03/2024    AST 13 (L) 09/03/2024    ALT 21 09/03/2024    ANIONGAP 11 09/03/2024       Lab Results   Component Value Date    WBC 10.17 09/03/2024    RBC 4.59 09/03/2024    HGB 14.2 09/03/2024    HCT 42.2 09/03/2024    MCV 91.9 09/03/2024    RDW 12.3 09/03/2024     09/03/2024        Lab Results   Component Value Date    CHOL 135 09/13/2024    TRIG 189 (H) 09/13/2024    HDL 39 (L) 09/13/2024    LDLCALC 58 09/13/2024       No results found for: "TSH"    No results found for: "HGBA1C", "ESTIMATEDAVG"     No components found for: "VITAMIND"    No results found for: "PSA"    Point Of Care Testing:  WBC, UA   Date Value Ref Range Status   11/11/2024 Trace  Final     Nitrite, UA   Date Value Ref Range Status   11/11/2024 Negative  Final     Urobilinogen, UA   Date Value Ref Range Status   11/11/2024 0.2  Final     Protein, POC   Date Value Ref Range Status   11/11/2024 Negative  Final     pH, UA   Date Value Ref Range Status   11/11/2024 6.0  Final     Spec Grav UA   Date Value Ref Range Status   11/11/2024 <=1.005  Final     Ketones, UA   Date Value Ref Range Status   11/11/2024 Negative  Final     Bilirubin, POC   Date Value Ref Range Status   11/11/2024 Negative  Final     Glucose, UA   Date Value Ref Range Status   11/11/2024 Negative  Final       No results found for: "[id number]", "RAPFLUA", "RAPFLUB"      Assessment/Plan     1. Vaginal discharge  -     POCT URINALYSIS W/O SCOPE  -     Bacterial Vaginosis; Future; Expected date: 11/11/2024       Lab pending, will notify pt of results.    Return to clinic if symptoms worsen or fail to improve.    Questions answered to desired level of satisfaction    Verbalized understanding to all information and instructions provided      AXEL [facility]    "

## 2024-11-12 DIAGNOSIS — B37.31 VAGINAL CANDIDA: Primary | ICD-10-CM

## 2024-11-12 LAB
CHLAMYDIA BY PCR: NEGATIVE
N. GONORRHOEAE (GC) BY PCR: NEGATIVE
TRICHOMONAS NAT: NEGATIVE

## 2024-11-12 RX ORDER — FLUCONAZOLE 150 MG/1
150 TABLET ORAL DAILY
Qty: 1 TABLET | Refills: 0 | Status: SHIPPED | OUTPATIENT
Start: 2024-11-12 | End: 2024-11-13

## 2024-11-26 ENCOUNTER — PATIENT MESSAGE (OUTPATIENT)
Dept: ADMINISTRATIVE | Facility: HOSPITAL | Age: 29
End: 2024-11-26

## 2024-11-26 ENCOUNTER — OFFICE VISIT (OUTPATIENT)
Dept: FAMILY MEDICINE | Facility: CLINIC | Age: 29
End: 2024-11-26
Payer: COMMERCIAL

## 2024-11-26 VITALS
TEMPERATURE: 98 F | HEART RATE: 87 BPM | BODY MASS INDEX: 29.66 KG/M2 | WEIGHT: 178 LBS | OXYGEN SATURATION: 99 % | RESPIRATION RATE: 18 BRPM | HEIGHT: 65 IN | DIASTOLIC BLOOD PRESSURE: 73 MMHG | SYSTOLIC BLOOD PRESSURE: 110 MMHG

## 2024-11-26 DIAGNOSIS — R10.2 PELVIC PAIN: Primary | ICD-10-CM

## 2024-11-26 LAB
BACTERIA #/AREA URNS HPF: ABNORMAL /HPF
BILIRUB UR QL STRIP: NEGATIVE
CLARITY UR: ABNORMAL
COLOR UR: YELLOW
GLUCOSE UR STRIP-MCNC: NORMAL MG/DL
KETONES UR STRIP-SCNC: NEGATIVE MG/DL
LEUKOCYTE ESTERASE UR QL STRIP: ABNORMAL
MUCOUS, UA: ABNORMAL /LPF
NITRITE UR QL STRIP: NEGATIVE
PH UR STRIP: 7 PH UNITS
PROT UR QL STRIP: 10
RBC # UR STRIP: ABNORMAL /UL
RBC #/AREA URNS HPF: 62 /HPF
SP GR UR STRIP: 1.02
SQUAMOUS #/AREA URNS LPF: ABNORMAL /HPF
UROBILINOGEN UR STRIP-ACNC: NORMAL MG/DL
WBC #/AREA URNS HPF: 169 /HPF
WBC CLUMPS, UA: ABNORMAL /HPF

## 2024-11-26 RX ORDER — DEXTROAMPHETAMINE SACCHARATE, AMPHETAMINE ASPARTATE, DEXTROAMPHETAMINE SULFATE AND AMPHETAMINE SULFATE 5; 5; 5; 5 MG/1; MG/1; MG/1; MG/1
1 TABLET ORAL 2 TIMES DAILY
COMMUNITY
Start: 2024-10-29

## 2024-11-26 NOTE — PROGRESS NOTES
Fain[facility]  [address], [place] [address]  Phone: [phone]     Subjective     Name: Cristal Smith   YOB: 1995 (29 y.o.)  MRN: [id number]  Visit Date: 11/26/2024   Chief Complaint: Follow-up (Follow up)        HISTORY OF PRESENT ILLNESS:  Patient is a 28 y/o F who presents with chronic suprapubic pressure. Pt reports daily suprapubic pressure-like pain that is constant and worsens when she has the urge urinate. She initially though she had a UTI. She states her suprapubic pressure like pain has been worse since she had a IUD placed 3 yrs ago. She denies any fever, dysuria, hematuria, vomiting or diarrhea. She has been evaluated for STI's was found to be negative for gonorrhea, chlamydia and trichomonas. This has been an ongoing symptom for years. Pt was previously evaluated for nausea and vomiting as it related to anxiety. She is being followed by [facility] and reports an improved mood.           PAST MEDICAL HISTORY:  Significant Diagnoses - Patient  has no past medical history on file.  Medications - Patient has a current medication list which includes the following long-term medication(s): alprazolam, dextroamphetamine-amphetamine, clindamycin, and triamcinolone acetonide 0.1%.   Allergies - Patient is allergic to sulfa (sulfonamide antibiotics); neomycin; and latex, natural rubber.  Surgeries - Patient  has no past surgical history on file.  Family History - Patient family history is not on file.      SOCIAL HISTORY:  Tobacco - Patient  reports that she has been smoking cigarettes. She has never used smokeless tobacco.   Alcohol - Patient  reports no history of alcohol use.   Recreational Drugs - Patient  reports no history of drug use.       Review of Systems   Constitutional:  Negative for appetite change and fever.   HENT: Negative.  Negative for sore throat.    Respiratory:  Negative for shortness of breath.    Cardiovascular:  Negative for chest pain and  "palpitations.   Gastrointestinal:  Negative for abdominal pain, diarrhea, nausea and vomiting.   Genitourinary:  Positive for pelvic pain.          History reviewed. No pertinent past medical history.     Review of patient's allergies indicates:   Allergen Reactions    Sulfa (sulfonamide antibiotics) Rash    Neomycin Hives    Latex, natural rubber Rash        History reviewed. No pertinent surgical history.     History reviewed. No pertinent family history.    Current Outpatient Medications   Medication Instructions    ALPRAZolam (XANAX) 0.5 mg, Daily    clindamycin (CLEOCIN T) 1 % lotion Apply topically.    dextroamphetamine-amphetamine (ADDERALL) 20 mg tablet 1 tablet, 2 times daily    nitrofurantoin, macrocrystal-monohydrate, (MACROBID) 100 MG capsule 100 mg, Oral, 2 times daily    pimecrolimus (ELIDEL) 1 % cream 2 times daily    triamcinolone acetonide 0.1% (KENALOG) 0.1 % cream 2 times daily        Objective     /73 (BP Location: Right arm, Patient Position: Sitting)   Pulse 87   Temp 98 °F (36.7 °C) (Oral)   Resp 18   Ht 5' 5" (1.651 m)   Wt 80.7 kg (178 lb)   LMP 11/21/2024 (Approximate)   SpO2 99%   BMI 29.62 kg/m²     Physical Exam  Exam conducted with a chaperone present.   Constitutional:       Appearance: Normal appearance.   Eyes:      Conjunctiva/sclera: Conjunctivae normal.   Cardiovascular:      Rate and Rhythm: Normal rate and regular rhythm.   Pulmonary:      Effort: Pulmonary effort is normal.      Breath sounds: Normal breath sounds.   Abdominal:      General: Bowel sounds are normal.      Palpations: Abdomen is soft.      Tenderness: There is no abdominal tenderness.      Comments: Nontender pelvis on palpation   Genitourinary:     General: Normal vulva.      Pubic Area: No rash.       Vagina: Normal.      Cervix: Discharge present.   Skin:     General: Skin is warm.      Capillary Refill: Capillary refill takes less than 2 seconds.   Neurological:      Mental Status: She " is alert.        All recently obtained labs have been reviewed and discussed in detail with the patient.   Assessment     1. Pelvic pain         Plan     Problem List Items Addressed This Visit          GI    Pelvic pain - Primary     Chronic pelvic pain worsens when she has to urinate  Bimanual exam is normal  Urine microscopy and Ucx pending  Referral to ob/gyn  Follow up in 2 months         Relevant Orders    Urinalysis, Reflex to Urine Culture (Completed)    Ambulatory referral/consult to Obstetrics / Gynecology    Urinalysis, Microscopic (Completed)    Urine culture (Completed)         All orders:  Orders Placed This Encounter    Urine culture    Urinalysis, Reflex to Urine Culture    Urinalysis, Microscopic    Ambulatory referral/consult to Obstetrics / Gynecology          Follow up in about 2 months (around 1/26/2025).    Faina RODRIGUEZA  [facility]

## 2024-11-27 PROBLEM — R10.2 PELVIC PAIN: Status: ACTIVE | Noted: 2024-11-27

## 2024-11-27 NOTE — ASSESSMENT & PLAN NOTE
Chronic pelvic pain worsens when she has to urinate  Bimanual exam is normal  Urine microscopy and Ucx pending  Referral to ob/gyn  Follow up in 2 months

## 2024-11-28 LAB — UA COMPLETE W REFLEX CULTURE PNL UR: NORMAL

## 2024-11-29 ENCOUNTER — PATIENT OUTREACH (OUTPATIENT)
Facility: HOSPITAL | Age: 29
End: 2024-11-29
Payer: COMMERCIAL

## 2024-11-29 ENCOUNTER — PATIENT MESSAGE (OUTPATIENT)
Dept: FAMILY MEDICINE | Facility: CLINIC | Age: 29
End: 2024-11-29
Payer: COMMERCIAL

## 2024-11-29 NOTE — PROGRESS NOTES
Population Health Chart Review & Patient Outreach Details    Updates Requested / Reviewed:  [x]  Care Team Updated  []  Care Everywhere Updated & Reviewed  []  [facility] Reviewed  [x]   Reviewed  []  MIIX Reviewed    Health Maintenance Topics Addressed and Outreach Outcomes / Actions Taken:        Cervical Cancer Screening [x]  Pap Smear Scheduled in Primary Care or OBGYN    []  External Records Requested & Care Team Updated if Applicable       []  External Records Uploaded, Care Team Updated, & History Updated if Applicable    []  Patient Declined Scheduling Pap Smear    []  Patient Will Schedule with External Provider & Care Team Updated if Applicable

## 2024-12-02 DIAGNOSIS — R31.29 OTHER MICROSCOPIC HEMATURIA: Primary | ICD-10-CM

## 2024-12-11 RX ORDER — PHENAZOPYRIDINE HYDROCHLORIDE 100 MG/1
100 TABLET, FILM COATED ORAL
Qty: 30 TABLET | Refills: 0 | Status: SHIPPED | OUTPATIENT
Start: 2024-12-11 | End: 2024-12-21

## 2024-12-17 ENCOUNTER — OFFICE VISIT (OUTPATIENT)
Facility: CLINIC | Age: 29
End: 2024-12-17
Payer: COMMERCIAL

## 2024-12-17 VITALS
OXYGEN SATURATION: 99 % | HEIGHT: 65 IN | RESPIRATION RATE: 17 BRPM | SYSTOLIC BLOOD PRESSURE: 112 MMHG | HEART RATE: 88 BPM | WEIGHT: 174.81 LBS | BODY MASS INDEX: 29.12 KG/M2 | DIASTOLIC BLOOD PRESSURE: 74 MMHG

## 2024-12-17 DIAGNOSIS — Z30.49 ENCOUNTER FOR SURVEILLANCE OF OTHER CONTRACEPTIVE: ICD-10-CM

## 2024-12-17 DIAGNOSIS — R10.2 PELVIC PAIN: Primary | ICD-10-CM

## 2024-12-17 DIAGNOSIS — Z72.51 HIGH RISK HETEROSEXUAL BEHAVIOR: ICD-10-CM

## 2024-12-17 DIAGNOSIS — Z12.4 SCREENING FOR CERVICAL CANCER: ICD-10-CM

## 2024-12-17 DIAGNOSIS — Z11.3 ENCOUNTER FOR SCREENING FOR INFECTIONS WITH PREDOMINANTLY SEXUAL MODE OF TRANSMISSION: ICD-10-CM

## 2024-12-17 LAB
B-HCG UR QL: NEGATIVE
BILIRUB SERPL-MCNC: NEGATIVE MG/DL
BLOOD URINE, POC: NEGATIVE
CLARITY, UA: CLEAR
COLOR, UA: YELLOW
CTP QC/QA: YES
GLUCOSE UR QL STRIP: NEGATIVE
KETONES UR QL STRIP: NEGATIVE
LEUKOCYTE ESTERASE URINE, POC: NORMAL
NITRITE, POC UA: NEGATIVE
PH, POC UA: 6
PROTEIN, POC: NEGATIVE
SPECIFIC GRAVITY, POC UA: 1.02
UROBILINOGEN, POC UA: 0.2

## 2024-12-17 PROCEDURE — 99213 OFFICE O/P EST LOW 20 MIN: CPT | Mod: ,,, | Performed by: ADVANCED PRACTICE MIDWIFE

## 2024-12-17 PROCEDURE — 87491 CHLMYD TRACH DNA AMP PROBE: CPT | Mod: ,,, | Performed by: CLINICAL MEDICAL LABORATORY

## 2024-12-17 PROCEDURE — 88141 CYTOPATH C/V INTERPRET: CPT | Mod: ,,, | Performed by: PATHOLOGY

## 2024-12-17 PROCEDURE — 87591 N.GONORRHOEAE DNA AMP PROB: CPT | Mod: ,,, | Performed by: CLINICAL MEDICAL LABORATORY

## 2024-12-17 PROCEDURE — 88142 CYTOPATH C/V THIN LAYER: CPT | Mod: TC,GCY | Performed by: ADVANCED PRACTICE MIDWIFE

## 2024-12-17 PROCEDURE — 87624 HPV HI-RISK TYP POOLED RSLT: CPT | Mod: ,,, | Performed by: CLINICAL MEDICAL LABORATORY

## 2024-12-17 PROCEDURE — 81025 URINE PREGNANCY TEST: CPT | Mod: QW,,, | Performed by: ADVANCED PRACTICE MIDWIFE

## 2024-12-17 NOTE — PROGRESS NOTES
"Patient ID:  Cristal Smith is a 29 y.o. female      Chief Complaint:   Chief Complaint   Patient presents with    Annual Exam     Last pap: 1 year ago at [facility]    STD CHECK        HPI:  Cristal was referred by Dr. Hall for help with pelvic pain. She says the pain is mostly in the left lower side of her abdomen and has been there for about 2 years.  It is worse with activity and with an orgasm. She says it hurts in her left hip joint sometimes. She denies constipation or diarrhea.   She has an IUD and can feel the string.  She says she has been seen for hematuria and has an appt with a urologist.  Her urine today is clear.    LMP: Patient's last menstrual period was 2024 (approximate).   Sexually active:  yes  Contraceptive: Kyleena      Past Medical History:   Diagnosis Date    Psoriasis      Past Surgical History:   Procedure Laterality Date    TONSILLECTOMY AND ADENOIDECTOMY Bilateral        OB History          0    Para   0    Term   0       0    AB   0    Living   0         SAB   0    IAB   0    Ectopic   0    Multiple   0    Live Births   0                 /74 (BP Location: Left arm, Patient Position: Sitting)   Pulse 88   Resp 17   Ht 5' 5" (1.651 m)   Wt 79.3 kg (174 lb 12.8 oz)   LMP 2024 (Approximate)   SpO2 99%   BMI 29.09 kg/m²   Wt Readings from Last 3 Encounters:   24 79.3 kg (174 lb 12.8 oz)   24 80.7 kg (178 lb)   24 81.6 kg (180 lb)          ROS:  Review of Systems   Constitutional: Negative.    HENT:  Positive for nasal congestion.    Respiratory: Negative.     Cardiovascular: Negative.    Gastrointestinal:  Negative for constipation and diarrhea.   Endocrine: Negative.    Genitourinary:  Positive for dyspareunia, dysuria and hematuria. Negative for flank pain, frequency, menstrual problem, postcoital bleeding and vaginal dryness.   Musculoskeletal:  Positive for arthralgias and back pain.   Integumentary:  Negative.   Neurological: " Negative.    Psychiatric/Behavioral: Negative.     Breast: negative.           PHYSICAL EXAM:  Physical Exam  Constitutional:       General: She is not in acute distress.     Appearance: Normal appearance. She is not ill-appearing.   HENT:      Head: Normocephalic.      Nose: Nose normal.   Eyes:      Extraocular Movements: Extraocular movements intact.   Cardiovascular:      Rate and Rhythm: Normal rate and regular rhythm.      Pulses: Normal pulses.      Heart sounds: Normal heart sounds.   Pulmonary:      Effort: Pulmonary effort is normal.      Breath sounds: Normal breath sounds.   Chest:      Chest wall: No mass or tenderness.   Breasts:     Right: Normal. No inverted nipple, mass, nipple discharge, skin change or tenderness.      Left: Normal. No inverted nipple, mass, nipple discharge, skin change or tenderness.          Comments: Heart tattoo on sternum  Abdominal:      General: There is no distension.      Palpations: Abdomen is soft. There is no mass.      Tenderness: There is abdominal tenderness.      Comments: Mild tenderness in the left lower quadrant   Genitourinary:     General: Normal vulva.      Exam position: Lithotomy position.      Labia:         Right: No tenderness or lesion.         Left: No tenderness or lesion.       Vagina: Vaginal discharge present. No bleeding.      Cervix: Friability and lesion present.      Uterus: Tender. Not enlarged.       Adnexa: Right adnexa normal and left adnexa normal.            Comments: Pap collected, cervical inflammation..IUD string noted  Musculoskeletal:         General: Normal range of motion.      Cervical back: Normal range of motion. No tenderness.   Skin:     General: Skin is warm and dry.   Neurological:      Mental Status: She is alert and oriented to person, place, and time.   Psychiatric:         Mood and Affect: Mood normal.         Behavior: Behavior normal.         Thought Content: Thought content normal.         Judgment: Judgment normal.  "         Assessment:  Cristal Ortega" was seen today for annual exam and std check.    Diagnoses and all orders for this visit:    Pelvic pain  -     Cancel: X-Ray Abdomen AP 1 View; Future  -     US Pelvis Complete Non OB; Future  -     POCT URINALYSIS W/O SCOPE  -     X-Ray Abdomen AP 1 View; Future    High risk heterosexual behavior  -     POCT urine pregnancy  -     Chlamydia/GC, PCR; Future  -     Bacterial Vaginosis  -     Chlamydia/GC, PCR    Encounter for screening for infections with predominantly sexual mode of transmission  -     Chlamydia/GC, PCR; Future  -     Bacterial Vaginosis  -     Chlamydia/GC, PCR    Screening for cervical cancer  -     ThinPrep Pap Test; Future  -     ThinPrep Pap Test    Encounter for surveillance of other contraceptive  -     US Pelvis Complete Non OB; Future          ICD-10-CM ICD-9-CM    1. Pelvic pain  R10.2 [id number] US Pelvis Complete Non OB      POCT URINALYSIS W/O SCOPE      X-Ray Abdomen AP 1 View      CANCELED: X-Ray Abdomen AP 1 View      2. High risk heterosexual behavior  Z72.51 V69.2 POCT urine pregnancy      Chlamydia/GC, PCR      Bacterial Vaginosis      Chlamydia/GC, PCR      3. Encounter for screening for infections with predominantly sexual mode of transmission  Z11.3 V74.5 Chlamydia/GC, PCR      Bacterial Vaginosis      Chlamydia/GC, PCR      4. Screening for cervical cancer  Z12.4 V76.2 ThinPrep Pap Test      ThinPrep Pap Test      5. Encounter for surveillance of other contraceptive  Z30.49 V25.49 US Pelvis Complete Non OB          Plan: Schedule pelvic u/s  KUB done in clinic  Call result of testing  To ER for severe pain  Refer to gyn or GI if unable to determine cause of pain.      Follow up in about 3 weeks (around 1/7/2025).        Answers submitted by the patient for this visit:  Pelvic Pain Questionnaire (Submitted on 12/11/2024)  Chief Complaint: Pelvic pain  Chronicity: recurrent  Onset: more than 1 year ago  Frequency: daily  Progression since " onset: gradually worsening  Pain severity: mild  Affected side: both  Pregnant now?: No  abdominal pain: Yes  anorexia: Yes  back pain: Yes  chills: No  constipation: No  diarrhea: No  discolored urine: Yes  dysuria: Yes  fever: No  flank pain: No  frequency: No  headaches: Yes  hematuria: Yes  nausea: Yes  painful intercourse: Yes  rash: No  urgency: Yes  vomiting: No  Aggravated by: intercourse, tactile pressure, urinating  treatments tried: acetaminophen, antibiotics, anti-fungal cream, warm bath  Improvement on treatment: no relief  Sexual activity: sexually active  Partner with STD symptoms: yes  Birth control: an IUD  Menstrual history: regular  STD: Yes  abdominal surgery: No   section: No  Ectopic pregnancy: No  Endometriosis: No  herpes simplex: Yes  gynecological surgery: No  menorrhagia: Yes  metrorrhagia: No  miscarriage: No  ovarian cysts: No  perineal abscess: No  PID: No  terminated pregnancy: No  vaginosis: Yes

## 2024-12-17 NOTE — LETTER
December 17, 2024      [facility]  [address]  [place] [address]  Phone: [phone]  Fax: [phone]       Patient: Cristal Smith   YOB: 1995  Date of Visit: 12/17/2024    To Whom It May Concern:    Cole Smith  was at [facility] on 12/17/2024. The patient may return to work on 12/17/2024 with no restrictions. If you have any questions or concerns, or if I can be of further assistance, please do not hesitate to contact me.    Sincerely,    Aleja Sanchez LPN

## 2024-12-18 LAB
CHLAMYDIA BY PCR: NEGATIVE
N. GONORRHOEAE (GC) BY PCR: NEGATIVE

## 2024-12-19 ENCOUNTER — TELEPHONE (OUTPATIENT)
Facility: CLINIC | Age: 29
End: 2024-12-19
Payer: COMMERCIAL

## 2024-12-19 NOTE — TELEPHONE ENCOUNTER
----- Message from Luz Elena sent at 12/18/2024  6:15 PM CST -----  [phone]    Patient called wants to talk to nurse about xray

## 2024-12-24 ENCOUNTER — PATIENT MESSAGE (OUTPATIENT)
Facility: CLINIC | Age: 29
End: 2024-12-24
Payer: COMMERCIAL

## 2024-12-26 ENCOUNTER — OFFICE VISIT (OUTPATIENT)
Dept: FAMILY MEDICINE | Facility: CLINIC | Age: 29
End: 2024-12-26
Payer: COMMERCIAL

## 2024-12-26 VITALS
WEIGHT: 173 LBS | HEART RATE: 96 BPM | BODY MASS INDEX: 28.79 KG/M2 | OXYGEN SATURATION: 98 % | TEMPERATURE: 99 F | DIASTOLIC BLOOD PRESSURE: 85 MMHG | SYSTOLIC BLOOD PRESSURE: 140 MMHG

## 2024-12-26 DIAGNOSIS — L02.421 FURUNCLE OF RIGHT AXILLA: Primary | ICD-10-CM

## 2024-12-26 RX ORDER — CLINDAMYCIN HYDROCHLORIDE 300 MG/1
300 CAPSULE ORAL EVERY 8 HOURS
Qty: 21 CAPSULE | Refills: 0 | Status: SHIPPED | OUTPATIENT
Start: 2024-12-26 | End: 2025-01-02

## 2024-12-26 RX ORDER — MUPIROCIN 20 MG/G
OINTMENT TOPICAL 3 TIMES DAILY
Qty: 22 G | Refills: 0 | Status: SHIPPED | OUTPATIENT
Start: 2024-12-26

## 2024-12-26 NOTE — PROGRESS NOTES
Subjective:       Patient ID: Cristal Smith is a 29 y.o. female.    Chief Complaint: Abscess (Possible abscess or swollen lymph node under right armpit starting yesterday) and Fever    Right axilla abscess- started yesterday    Abscess  Associated Symptoms: no fever  Fever   Review of Systems   Constitutional:  Negative for activity change, fever and unexpected weight change.   Integumentary:  Positive for wound.       Objective:      Physical Exam  Vitals and nursing note reviewed. Exam conducted with a chaperone present.   Constitutional:       General: She is not in acute distress.     Appearance: Normal appearance. She is normal weight. She is not ill-appearing, toxic-appearing or diaphoretic.   Cardiovascular:      Rate and Rhythm: Normal rate and regular rhythm.      Pulses: Normal pulses.      Heart sounds: Normal heart sounds.   Pulmonary:      Effort: Pulmonary effort is normal.      Breath sounds: Normal breath sounds.   Chest:   Breasts:     Right: Normal. No swelling, bleeding, inverted nipple, nipple discharge, skin change or tenderness.      Left: Normal.   Musculoskeletal:      Cervical back: Normal range of motion and neck supple.   Skin:     Findings: Abscess present.      Comments: Right axilla region with a 1 cm raised, erythematous firm abscess- not for I&D at this time   Neurological:      Mental Status: She is alert.          Assessment:       1. Furuncle of right axilla        Plan:   Furuncle of right axilla  -     clindamycin (CLEOCIN) 300 MG capsule; Take 1 capsule (300 mg total) by mouth every 8 (eight) hours. for 21 doses  Dispense: 21 capsule; Refill: 0  -     mupirocin (BACTROBAN) 2 % ointment; Apply topically 3 (three) times daily.  Dispense: 22 g; Refill: 0           Risks, benefits, and side effects were discussed with the patient. All questions were answered to the fullest satisfaction of the patient, and pt verbalized understanding and agreement to treatment plan. Pt was to call  with any new or worsening symptoms, or present to the ER     76

## 2024-12-30 ENCOUNTER — HOSPITAL ENCOUNTER (OUTPATIENT)
Dept: RADIOLOGY | Facility: HOSPITAL | Age: 29
Discharge: HOME | End: 2024-12-30
Attending: ADVANCED PRACTICE MIDWIFE
Payer: COMMERCIAL

## 2024-12-30 DIAGNOSIS — Z30.49 ENCOUNTER FOR SURVEILLANCE OF OTHER CONTRACEPTIVE: ICD-10-CM

## 2024-12-30 DIAGNOSIS — R10.2 PELVIC PAIN: ICD-10-CM

## 2024-12-30 PROCEDURE — 76856 US EXAM PELVIC COMPLETE: CPT | Mod: TC

## 2024-12-30 PROCEDURE — 76856 US EXAM PELVIC COMPLETE: CPT | Mod: 26,,, | Performed by: RADIOLOGY

## 2025-01-07 ENCOUNTER — OFFICE VISIT (OUTPATIENT)
Facility: CLINIC | Age: 30
End: 2025-01-07
Payer: COMMERCIAL

## 2025-01-07 VITALS
WEIGHT: 180.38 LBS | HEIGHT: 65 IN | DIASTOLIC BLOOD PRESSURE: 75 MMHG | SYSTOLIC BLOOD PRESSURE: 110 MMHG | BODY MASS INDEX: 30.05 KG/M2 | HEART RATE: 91 BPM | RESPIRATION RATE: 17 BRPM | OXYGEN SATURATION: 96 %

## 2025-01-07 DIAGNOSIS — N92.6 IRREGULAR MENSES: ICD-10-CM

## 2025-01-07 DIAGNOSIS — R10.2 PELVIC PAIN: Primary | ICD-10-CM

## 2025-01-07 PROCEDURE — 99212 OFFICE O/P EST SF 10 MIN: CPT | Mod: ,,, | Performed by: ADVANCED PRACTICE MIDWIFE

## 2025-01-07 RX ORDER — CLINDAMYCIN HYDROCHLORIDE 300 MG/1
300 CAPSULE ORAL EVERY 8 HOURS
COMMUNITY

## 2025-01-13 NOTE — PROGRESS NOTES
"Patient ID:  Cristal Smith is a 29 y.o. female      Chief Complaint:   Chief Complaint   Patient presents with    Follow-up        HPI:  I saw Cristal 2024 for pelvic pain that had been ongoing for 2 years.  She has an IUD.  Her pelvic u/s showed corrected placement and no abnormality.  The KUB was normal.  Her testing for infection was negative and pelvic exam unremarkable.  She is still having pain in her lower left abdomen.  We discussed the possibility of endometriosis.  LMP: Patient's last menstrual period was 2024 (exact date).   Sexually active:  yes  Contraceptive: IUD      Past Medical History:   Diagnosis Date    Psoriasis      Past Surgical History:   Procedure Laterality Date    TONSILLECTOMY AND ADENOIDECTOMY Bilateral        OB History          0    Para   0    Term   0       0    AB   0    Living   0         SAB   0    IAB   0    Ectopic   0    Multiple   0    Live Births   0                 /75 (BP Location: Left arm, Patient Position: Sitting)   Pulse 91   Resp 17   Ht 5' 5" (1.651 m)   Wt 81.8 kg (180 lb 6.4 oz)   LMP 2024 (Exact Date)   SpO2 96%   BMI 30.02 kg/m²   Wt Readings from Last 3 Encounters:   25 81.8 kg (180 lb 6.4 oz)   24 78.5 kg (173 lb)   24 79.3 kg (174 lb 12.8 oz)          ROS:  Review of Systems   Constitutional: Negative.    HENT: Negative.     Cardiovascular: Negative.    Gastrointestinal: Negative.    Endocrine: Negative.    Genitourinary:  Positive for menstrual problem and pelvic pain.   Musculoskeletal: Negative.    Neurological: Negative.    Psychiatric/Behavioral: Negative.     Breast: negative.           PHYSICAL EXAM:  Physical Exam     Assessment:  Cristal Ortega" was seen today for follow-up.    Diagnoses and all orders for this visit:    Pelvic pain  -     Ambulatory referral/consult to Obstetrics / Gynecology; Future    Irregular menses          ICD-10-CM ICD-9-CM    1. Pelvic pain  R10.2 [id number] " Ambulatory referral/consult to Obstetrics / Gynecology      2. Irregular menses  N92.6 626.4              Plan:  Referral to Dr. Copeland for help with pelvic pain      Follow up referred to Dr. Copeland.

## 2025-02-04 ENCOUNTER — OFFICE VISIT (OUTPATIENT)
Dept: FAMILY MEDICINE | Facility: CLINIC | Age: 30
End: 2025-02-04
Payer: COMMERCIAL

## 2025-02-04 VITALS
HEIGHT: 65 IN | WEIGHT: 177.38 LBS | OXYGEN SATURATION: 99 % | RESPIRATION RATE: 18 BRPM | HEART RATE: 80 BPM | SYSTOLIC BLOOD PRESSURE: 124 MMHG | DIASTOLIC BLOOD PRESSURE: 76 MMHG | BODY MASS INDEX: 29.55 KG/M2 | TEMPERATURE: 98 F

## 2025-02-04 DIAGNOSIS — R82.90 ABNORMAL FINDING ON URINALYSIS: ICD-10-CM

## 2025-02-04 DIAGNOSIS — R10.2 PELVIC PAIN: Primary | ICD-10-CM

## 2025-02-04 DIAGNOSIS — Z11.1 TUBERCULOSIS SCREENING: ICD-10-CM

## 2025-02-04 LAB
BILIRUB SERPL-MCNC: NORMAL MG/DL
BLOOD URINE, POC: NORMAL
CLARITY, UA: NORMAL
COLOR, UA: YELLOW
GLUCOSE UR QL STRIP: NORMAL
KETONES UR QL STRIP: NORMAL
LEUKOCYTE ESTERASE URINE, POC: NORMAL
NITRITE, POC UA: NORMAL
PH, POC UA: 7.5
PROTEIN, POC: NORMAL
SPECIFIC GRAVITY, POC UA: 1.02
UROBILINOGEN, POC UA: 0.2

## 2025-02-04 PROCEDURE — 99212 OFFICE O/P EST SF 10 MIN: CPT | Mod: GC,,, | Performed by: FAMILY MEDICINE

## 2025-02-04 PROCEDURE — 86481 TB AG RESPONSE T-CELL SUSP: CPT | Mod: ,,, | Performed by: CLINICAL MEDICAL LABORATORY

## 2025-02-04 RX ORDER — IBUPROFEN 800 MG/1
800 TABLET ORAL
COMMUNITY
Start: 2024-11-27

## 2025-02-04 RX ORDER — CICLOPIROX 80 MG/ML
SOLUTION TOPICAL NIGHTLY
COMMUNITY
Start: 2024-12-05

## 2025-02-05 PROBLEM — R82.90 ABNORMAL FINDING ON URINALYSIS: Status: ACTIVE | Noted: 2025-02-05

## 2025-02-05 NOTE — PROGRESS NOTES
[facility]  [address], [place] [address]  Phone: [phone]     Subjective     Name: Cristal Smith   YOB: 1995 (29 y.o.)  MRN: [id number]  Visit Date: 2/4/2025   Chief Complaint: Follow-up (Follow up)        HISTORY OF PRESENT ILLNESS:  Pt is a 30 y/o F who presents for 2 month follow up. The patient previous reported experiencing pelvic pain and pressure. Pt associated her pelvic pain with sexual activity. The discomfort was described as occurring during and after sexual intercourse, lasting for several days. The patient noted a correlation between increased sexual activity and the intensity of the pain, suggesting that deep penetration might be a contributing factor. The patient acknowledged that abstaining from sexual activity for periods, such as a week and a half, resulted in decreased pelvic pressure. The patient expressed concern that the partner's anatomical size might be contributing to the discomfort. The patient reported no issues with lubrication during intercourse but mentioned that additional lubrication did not alleviate the pain. The patient was advised to consider altering sexual positions and techniques to mitigate discomfort. The patient also mentioned taking ibuprofen prior to sexual activity to manage pain. Pt has a h/o abnormal urinalysis will screen again. Pt also requested a TB screening test for her place of employment.           PAST MEDICAL HISTORY:  Significant Diagnoses - Patient  has a past medical history of Psoriasis.  Medications - Patient has a current medication list which includes the following long-term medication(s): alprazolam, ciclopirox, and dextroamphetamine-amphetamine.   Allergies - Patient is allergic to sulfa (sulfonamide antibiotics); neomycin; nickel; bacitracin; and latex, natural rubber.  Surgeries - Patient  has a past surgical history that includes Tonsillectomy and adenoidectomy (Bilateral).  Family History - Patient  "family history includes Cancer in her paternal grandmother; Diabetes in her maternal grandfather; Osteoarthritis in her maternal grandmother; Thyroid disease in her mother.      SOCIAL HISTORY:  Tobacco - Patient  reports that she has been smoking cigarettes. She has never used smokeless tobacco.   Alcohol - Patient  reports no history of alcohol use.   Recreational Drugs - Patient  reports no history of drug use.       Review of Systems   Constitutional:  Negative for appetite change and fever.   HENT: Negative.  Negative for sore throat.    Respiratory:  Negative for shortness of breath.    Cardiovascular:  Negative for chest pain and palpitations.   Gastrointestinal:  Negative for abdominal pain, diarrhea, nausea and vomiting.   Genitourinary:  Positive for pelvic pain. Negative for dysuria, frequency, hematuria, urgency and vaginal pain.          Past Medical History:   Diagnosis Date    Psoriasis         Review of patient's allergies indicates:   Allergen Reactions    Sulfa (sulfonamide antibiotics) Rash    Neomycin Hives    Nickel     Bacitracin Hives and Rash    Latex, natural rubber Rash        Past Surgical History:   Procedure Laterality Date    TONSILLECTOMY AND ADENOIDECTOMY Bilateral         Family History   Problem Relation Name Age of Onset    Thyroid disease Mother      Osteoarthritis Maternal Grandmother      Diabetes Maternal Grandfather      Cancer Paternal Grandmother         Current Outpatient Medications   Medication Instructions    ALPRAZolam (XANAX) 0.5 mg, Daily    ciclopirox (PENLAC) 8 % Soln Nightly    dextroamphetamine-amphetamine (ADDERALL) 20 mg tablet 1 tablet, 2 times daily    ibuprofen (ADVIL,MOTRIN) 800 mg, As needed (PRN)    mupirocin (BACTROBAN) 2 % ointment Topical (Top), 3 times daily        Objective     /76 (BP Location: Left arm, Patient Position: Sitting)   Pulse 80   Temp 98.2 °F (36.8 °C) (Oral)   Resp 18   Ht 5' 5" (1.651 m)   Wt 80.5 kg (177 lb 6.4 oz)   LMP " 12/24/2024 (Exact Date)   SpO2 99%   BMI 29.52 kg/m²     Physical Exam  Constitutional:       Appearance: Normal appearance.   HENT:      Head: Normocephalic and atraumatic.   Cardiovascular:      Rate and Rhythm: Normal rate and regular rhythm.      Pulses: Normal pulses.      Heart sounds: Normal heart sounds.   Pulmonary:      Effort: Pulmonary effort is normal.      Breath sounds: Normal breath sounds. No wheezing or rhonchi.   Abdominal:      Palpations: Abdomen is soft.      Tenderness: There is no abdominal tenderness.   Skin:     General: Skin is warm and dry.      Capillary Refill: Capillary refill takes less than 2 seconds.   Neurological:      Mental Status: She is alert.   Psychiatric:         Mood and Affect: Mood normal.          All recently obtained labs have been reviewed and discussed in detail with the patient.   Assessment     1. Pelvic pain    2. Abnormal finding on urinalysis    3. Tuberculosis screening         Plan     Problem List Items Addressed This Visit       Pelvic pain - Primary     Chronic pelvic pain which is worse during vaginal sexual intercourse  Deep penetration and size of partner's genitalia have contributed to pt's pelvic discomfort  Avoid deep penetration, try different sexual position prevent deep penetration,   allow time between each sexual encounter to give some pelvic rest  Take ibuprofen 400 mg, 2 tabs PO prior to intercourse  Follow up in 3 months         Abnormal finding on urinalysis     No hematuria, increased urinary frequency   Urinalysis pending         Relevant Orders    POCT URINALYSIS W/O SCOPE (Completed)     Other Visit Diagnoses       Tuberculosis screening        Relevant Orders    Quantiferon Gold TB              All orders:  Orders Placed This Encounter    Quantiferon Gold TB    POCT URINALYSIS W/O SCOPE          Follow up in about 3 months (around 5/4/2025).    Faina Ponce MD, MBA  [facility]

## 2025-02-06 NOTE — PROGRESS NOTES
UA is negative for nitrites and blood but has a small amount of WBC. Not concerning for a UTI. Will send message to pt relaying her results

## 2025-02-06 NOTE — ASSESSMENT & PLAN NOTE
Chronic pelvic pain which is worse during vaginal sexual intercourse  Deep penetration and size of partner's genitalia have contributed to pt's pelvic discomfort  Avoid deep penetration, try different sexual position prevent deep penetration,   allow time between each sexual encounter to give some pelvic rest  Take ibuprofen 400 mg, 2 tabs PO prior to intercourse  Follow up in 3 months

## 2025-02-10 LAB
GAMMA INTERFERON BACKGROUND BLD IA-ACNC: 0.01 [IU]/ML
M TB IFN-G CD4+ BCKGRND COR BLD-ACNC: 0 [IU]/ML
MITOGEN IGNF BCKGRD COR BLD-ACNC: >10 [IU]/ML
QUANTIFERON-TB GOLD PLUS RESULT: NEGATIVE
TB2 AG MINUS NIL RESULT: 0

## 2025-02-11 ENCOUNTER — PATIENT MESSAGE (OUTPATIENT)
Dept: FAMILY MEDICINE | Facility: CLINIC | Age: 30
End: 2025-02-11
Payer: COMMERCIAL

## 2025-02-12 NOTE — PROGRESS NOTES
TB test was ordered for screening for current employment. It is negative. Urinalysis is negative for blood and nitrites. No signs of dysuria or hematuria per pt. No significant findings. No further testing needed

## 2025-05-20 PROCEDURE — 88312 SPECIAL STAINS GROUP 1: CPT | Mod: 26,,, | Performed by: PATHOLOGY

## 2025-05-20 PROCEDURE — 88305 TISSUE EXAM BY PATHOLOGIST: CPT | Mod: TC,SUR

## 2025-05-20 PROCEDURE — 88305 TISSUE EXAM BY PATHOLOGIST: CPT | Mod: 26,,, | Performed by: PATHOLOGY

## 2025-05-21 ENCOUNTER — LAB REQUISITION (OUTPATIENT)
Dept: LAB | Facility: HOSPITAL | Age: 30
End: 2025-05-21
Payer: COMMERCIAL

## 2025-05-21 DIAGNOSIS — D49.2 NEOPLASM OF UNSPECIFIED BEHAVIOR OF BONE, SOFT TISSUE, AND SKIN: ICD-10-CM

## 2025-05-22 LAB
DHEA SERPL-MCNC: NORMAL
ESTROGEN SERPL-MCNC: NORMAL PG/ML
INSULIN SERPL-ACNC: NORMAL U[IU]/ML
LAB AP GROSS DESCRIPTION: NORMAL
LAB AP LABORATORY NOTES: NORMAL
LAB AP SPEC A DDX: NORMAL
LAB AP SPEC A MORPHOLOGY: NORMAL
LAB AP SPEC A PROCEDURE: NORMAL
T3RU NFR SERPL: NORMAL %

## 2025-06-04 ENCOUNTER — OFFICE VISIT (OUTPATIENT)
Dept: UROLOGY | Facility: CLINIC | Age: 30
End: 2025-06-04
Payer: COMMERCIAL

## 2025-06-04 VITALS
BODY MASS INDEX: 27.49 KG/M2 | SYSTOLIC BLOOD PRESSURE: 108 MMHG | OXYGEN SATURATION: 99 % | HEIGHT: 65 IN | DIASTOLIC BLOOD PRESSURE: 60 MMHG | HEART RATE: 72 BPM | WEIGHT: 165 LBS

## 2025-06-04 DIAGNOSIS — E83.59 NEPHROCALCINOSIS: Primary | ICD-10-CM

## 2025-06-04 DIAGNOSIS — N29 NEPHROCALCINOSIS: Primary | ICD-10-CM

## 2025-06-04 DIAGNOSIS — R31.29 OTHER MICROSCOPIC HEMATURIA: ICD-10-CM

## 2025-06-04 PROCEDURE — 99999 PR PBB SHADOW E&M-EST. PATIENT-LVL V: CPT | Mod: PBBFAC,,, | Performed by: UROLOGY

## 2025-06-04 PROCEDURE — 99215 OFFICE O/P EST HI 40 MIN: CPT | Mod: PBBFAC | Performed by: UROLOGY

## 2025-06-05 ENCOUNTER — OFFICE VISIT (OUTPATIENT)
Dept: FAMILY MEDICINE | Facility: CLINIC | Age: 30
End: 2025-06-05
Payer: COMMERCIAL

## 2025-06-05 VITALS
BODY MASS INDEX: 26.82 KG/M2 | HEIGHT: 65 IN | RESPIRATION RATE: 18 BRPM | DIASTOLIC BLOOD PRESSURE: 65 MMHG | OXYGEN SATURATION: 98 % | SYSTOLIC BLOOD PRESSURE: 103 MMHG | WEIGHT: 161 LBS | TEMPERATURE: 98 F | HEART RATE: 80 BPM

## 2025-06-05 DIAGNOSIS — F41.9 ANXIETY: Primary | ICD-10-CM

## 2025-06-05 DIAGNOSIS — Z91.018 MULTIPLE FOOD ALLERGIES: ICD-10-CM

## 2025-06-05 DIAGNOSIS — L40.8 PSORIASIFORM SEBORRHEIC DERMATITIS: ICD-10-CM

## 2025-06-05 DIAGNOSIS — L30.8 OTHER ECZEMA: ICD-10-CM

## 2025-06-05 DIAGNOSIS — Z71.3 NUTRITIONAL COUNSELING: ICD-10-CM

## 2025-06-05 PROBLEM — L30.9 ECZEMA: Status: ACTIVE | Noted: 2025-06-05

## 2025-06-05 RX ORDER — NORETHINDRONE ACETATE AND ETHINYL ESTRADIOL, AND FERROUS FUMARATE 1MG-20(24)
1 KIT ORAL
COMMUNITY

## 2025-06-06 DIAGNOSIS — K59.00 CONSTIPATION, UNSPECIFIED CONSTIPATION TYPE: ICD-10-CM

## 2025-06-06 DIAGNOSIS — R21 RASH: ICD-10-CM

## 2025-06-06 DIAGNOSIS — R50.9 FEVER, UNSPECIFIED FEVER CAUSE: Primary | ICD-10-CM

## 2025-06-06 DIAGNOSIS — L40.8 PSORIASIFORM SEBORRHEIC DERMATITIS: ICD-10-CM

## 2025-06-06 DIAGNOSIS — R63.4 WEIGHT LOSS: ICD-10-CM

## 2025-06-06 PROBLEM — R82.90 ABNORMAL FINDING ON URINALYSIS: Status: RESOLVED | Noted: 2025-02-05 | Resolved: 2025-06-06

## 2025-06-06 PROBLEM — R10.2 PELVIC PAIN: Status: RESOLVED | Noted: 2024-11-27 | Resolved: 2025-06-06

## 2025-06-12 ENCOUNTER — PATIENT MESSAGE (OUTPATIENT)
Dept: FAMILY MEDICINE | Facility: CLINIC | Age: 30
End: 2025-06-12
Payer: COMMERCIAL

## 2025-06-13 ENCOUNTER — PATIENT MESSAGE (OUTPATIENT)
Dept: FAMILY MEDICINE | Facility: CLINIC | Age: 30
End: 2025-06-13
Payer: COMMERCIAL

## 2025-06-17 ENCOUNTER — OFFICE VISIT (OUTPATIENT)
Dept: FAMILY MEDICINE | Facility: CLINIC | Age: 30
End: 2025-06-17
Payer: COMMERCIAL

## 2025-06-17 ENCOUNTER — HOSPITAL ENCOUNTER (OUTPATIENT)
Dept: RADIOLOGY | Facility: HOSPITAL | Age: 30
Discharge: HOME | End: 2025-06-17
Attending: UROLOGY
Payer: COMMERCIAL

## 2025-06-17 ENCOUNTER — RESULTS FOLLOW-UP (OUTPATIENT)
Dept: FAMILY MEDICINE | Facility: CLINIC | Age: 30
End: 2025-06-17
Payer: COMMERCIAL

## 2025-06-17 VITALS
SYSTOLIC BLOOD PRESSURE: 112 MMHG | OXYGEN SATURATION: 97 % | HEART RATE: 76 BPM | WEIGHT: 158.63 LBS | DIASTOLIC BLOOD PRESSURE: 75 MMHG | HEIGHT: 65 IN | TEMPERATURE: 98 F | BODY MASS INDEX: 26.43 KG/M2 | RESPIRATION RATE: 18 BRPM

## 2025-06-17 DIAGNOSIS — L40.8 PSORIASIFORM SEBORRHEIC DERMATITIS: ICD-10-CM

## 2025-06-17 DIAGNOSIS — Z28.09 VACCINE CONTRAINDICATED: ICD-10-CM

## 2025-06-17 DIAGNOSIS — L30.8 OTHER ECZEMA: ICD-10-CM

## 2025-06-17 DIAGNOSIS — E83.59 NEPHROCALCINOSIS: ICD-10-CM

## 2025-06-17 DIAGNOSIS — N29 NEPHROCALCINOSIS: ICD-10-CM

## 2025-06-17 DIAGNOSIS — Z91.018 MULTIPLE FOOD ALLERGIES: ICD-10-CM

## 2025-06-17 DIAGNOSIS — Z88.9 MULTIPLE ALLERGIES: Primary | ICD-10-CM

## 2025-06-17 DIAGNOSIS — R31.29 OTHER MICROSCOPIC HEMATURIA: ICD-10-CM

## 2025-06-17 PROCEDURE — 76775 US EXAM ABDO BACK WALL LIM: CPT | Mod: 26,,, | Performed by: INTERNAL MEDICINE

## 2025-06-17 PROCEDURE — 76775 US EXAM ABDO BACK WALL LIM: CPT | Mod: TC

## 2025-06-17 PROCEDURE — 99213 OFFICE O/P EST LOW 20 MIN: CPT | Mod: GC,,, | Performed by: FAMILY MEDICINE

## 2025-06-17 NOTE — LETTER
June 17, 2025    Cristal Smith  [address] [place] [address]             [facility]  Family Medicine  [address]  [facility] [address]  Phone: [phone]  Fax: [phone]   June 17, 2025     Patient: Cristal Smith   YOB: 1995   Date of Visit: 6/17/2025       To Whom it May Concern:    Cristal Smith was seen in my clinic on 6/17/2025. She may return to work once reasonable accommodations are met given the severity of her condition.    Please excuse her from any classes or work missed.    If you have any questions or concerns, please don't hesitate to call.    Sincerely,         Faina Ponce MD

## 2025-06-17 NOTE — PROGRESS NOTES
Faina Ponce MD [facility]  [address], [place] [address]  Phone: [phone]     Subjective     Name: Cristal Smith   YOB: 1995 (29 y.o.)  MRN: [id number]  Visit Date: 6/17/2025   Chief Complaint: Follow-up (Pt present to clinic lab work review. )        HISTORY OF PRESENT ILLNESS:  Patient is a 30 y/o F who presents for follow up to anxiety and allergies. Pt multiple chemical allergies that require significant sterilization in order to conduct her job duties in a safe environment. All her allergies have been reviewed and submitted to her employer. Pt has been referred to a new dermatologist to help rectify her on going dermatological pathology which consists of contact dermatitis and eczema. Pt still has erthematous patches in the cubital fossa. Pt is requesting FMLA from her employer due to her ongoing medical problems. She would benefit from this as she needs necessary accommodations to allow for doctors appointments, medical procedures and sick leave (which would be based on her symptom presentation. Vaccines are contraindicated at this time given her allergies and the chemical composition of most vaccines.             PAST MEDICAL HISTORY:  Significant Diagnoses - Patient  has a past medical history of Abnormal finding on urinalysis (02/05/2025), Pelvic pain (11/27/2024), and Psoriasis.  Medications - Patient has a current medication list which includes the following long-term medication(s): alprazolam and dextroamphetamine-amphetamine.   Allergies - Patient is allergic to sulfa (sulfonamide antibiotics); adhesive tape-silicones; diphenhydramine; gloves, latex with aloe vera; neomycin; nickel; nitrile; bacitracin; benzoate analogues; latex, natural rubber; and triamcinolone acetonide.  Surgeries - Patient  has a past surgical history that includes Tonsillectomy and adenoidectomy (Bilateral).  Family History - Patient family history includes Cancer in her paternal grandmother; Diabetes  in her maternal grandfather; Osteoarthritis in her maternal grandmother; Thyroid disease in her mother.      SOCIAL HISTORY:  Tobacco - Patient  reports that she has been smoking cigarettes. She has never used smokeless tobacco.   Alcohol - Patient  reports no history of alcohol use.   Recreational Drugs - Patient  reports no history of drug use.       Review of Systems   Constitutional:  Negative for appetite change and fever.   HENT: Negative.  Negative for sore throat.    Respiratory:  Negative for shortness of breath.    Cardiovascular:  Negative for chest pain and palpitations.   Gastrointestinal:  Negative for abdominal pain, diarrhea, nausea and vomiting.   Genitourinary:  Negative for dysuria, frequency, hematuria, pelvic pain, urgency and vaginal pain.   Integumentary:  Positive for rash.   Allergic/Immunologic: Positive for environmental allergies and food allergies.   Psychiatric/Behavioral:  The patient is nervous/anxious.           Past Medical History:   Diagnosis Date    Abnormal finding on urinalysis 02/05/2025    Pelvic pain 11/27/2024    Psoriasis         Review of patient's allergies indicates:   Allergen Reactions    Sulfa (sulfonamide antibiotics) Rash    Adhesive tape-silicones Hives    Diphenhydramine Hives and Hallucinations     May be due to the shellac coating    Gloves, latex with aloe vera Other (See Comments)    Neomycin Hives    Nickel     Nitrile Hives    Bacitracin Hives and Rash    Benzoate analogues Anxiety, Dermatitis, Hives, Itching and Rash    Latex, natural rubber Rash    Triamcinolone acetonide Rash        Past Surgical History:   Procedure Laterality Date    TONSILLECTOMY AND ADENOIDECTOMY Bilateral         Family History   Problem Relation Name Age of Onset    Thyroid disease Mother      Osteoarthritis Maternal Grandmother      Diabetes Maternal Grandfather      Cancer Paternal Grandmother         Current Outpatient Medications   Medication Instructions    ALPRAZolam (XANAX)  "0.5 mg, Daily    dextroamphetamine-amphetamine (ADDERALL) 20 mg tablet 1 tablet, 2 times daily    ibuprofen (ADVIL,MOTRIN) 800 mg, As needed (PRN)    norethindrone-e.estradioL-iron (TAYTULLA) 1 mg-20 mcg (24)/75 mg (4) Cap 1 capsule        Objective     /75 (BP Location: Left arm, Patient Position: Sitting)   Pulse 76   Temp 98.2 °F (36.8 °C) (Oral)   Resp 18   Ht 5' 5" (1.651 m)   Wt 71.9 kg (158 lb 9.6 oz)   SpO2 97%   BMI 26.39 kg/m²     Physical Exam  Cardiovascular:      Rate and Rhythm: Normal rate and regular rhythm.   Pulmonary:      Effort: Pulmonary effort is normal.      Breath sounds: Normal breath sounds.   Abdominal:      General: Bowel sounds are normal.      Palpations: Abdomen is soft.   Skin:     General: Skin is warm and dry.      Findings: Rash present.      Comments: Erythematous rash in the cubital fossa bilaterally   Neurological:      Mental Status: She is alert.          All recently obtained labs have been reviewed and discussed in detail with the patient.   Assessment     1. Multiple allergies    2. Psoriasiform seborrheic dermatitis    3. Other eczema    4. Vaccine contraindicated    5. Multiple food allergies         Plan     Problem List Items Addressed This Visit       Psoriasiform seborrheic dermatitis    Eczema    Multiple food allergies    Multiple allergies - Primary    Pt has multiple food, drug and other chemical allergies  Results in hives and rash  List of allergies listed below.   Followed by dermatologist and allergist  Pt needs special accommodations at work to ensure a safe environment  Pt needs to remain on leave until reasonable accommodations are met  Follow up in 3 months or earlier    Allergens: lauramidoproyl betaine, PPG-2 hydroxyethyl cocamide, stearamidopropyl betaine, brassicamidopropyl dimethylamine, cocamide KATE, methoxy cinnamidopropyl hydroxy, soyamidopropylamine oxide, cocamidopropyl oxide, bactracin zinc, top free, thimerosal, ammoniate " mercury, mercuric chloride, mercury, metallic mercury, amalgam, phenylmercuric nitrate, mercurius sublimatus, shellac wax, ammonium shellacate, laccifer lacca, pharmaceutical glaze, himanthalia elongata extract, kelp powder, sargussum pallidum extract, carrageenan, chrondrus crispus, organic carrageenan Armenian duncan, fucus vesiculosus, saccharina japonica extract, nickel sulfate, cobalt chloride, nickel, cobalt sulfate, colbalt aluminum oxide, cyanmocobalamin, transitional metal catalyst, tribenzoin, styrax benzoin, gum benzoin, benzoin siam absolute, fragrance, benzoin tree essential oil, myroxylyn balsamum (yusra balsam), carba mix, iodopropynyl butylcarbamate, cobalt dichloride, nickel, balsam of peru, bacitracin, phenylmercuric acetate, zinc dimethyldithiocarbamate, carbamates, acylates.carbamate copolymer, glydant plus, inulin lauryl carbamate, carbachol, di-hema trimethylhexyl dicarbamate, zinc diethyldithiocarbamate         Vaccine contraindicated    Given her multiple allergies and allergies to certain ingredients in vaccines. All vaccinations are contraindicated. Any future vaccinations should be  approved by allergist conducted under the supervision of her Allergist               All orders:        Instructed patient that if symptoms fail to improve or worsen patient should seek immediate medical attention or report to the nearest emergency department. Patient expressed verbal agreement and understanding to this plan of care.       Follow up in about 3 months (around 9/17/2025).    Faina Ponce MD, MBA  [facility]

## 2025-06-17 NOTE — ASSESSMENT & PLAN NOTE
Pt has multiple food, drug and other chemical allergies  Results in hives and rash  List of allergies listed below.   Followed by dermatologist and allergist  Pt needs special accommodations at work to ensure a safe environment  Pt needs to remain on leave until reasonable accommodations are met  Follow up in 3 months or earlier    Allergens: lauramidoproyl betaine, PPG-2 hydroxyethyl cocamide, stearamidopropyl betaine, brassicamidopropyl dimethylamine, cocamide KATE, methoxy cinnamidopropyl hydroxy, soyamidopropylamine oxide, cocamidopropyl oxide, bactracin zinc, top free, thimerosal, ammoniate mercury, mercuric chloride, mercury, metallic mercury, amalgam, phenylmercuric nitrate, mercurius sublimatus, shellac wax, ammonium shellacate, laccifer lacca, pharmaceutical glaze, himanthalia elongata extract, kelp powder, sargussum pallidum extract, carrageenan, chrondrus crispus, organic carrageenan Namibian duncan, fucus vesiculosus, saccharina japonica extract, nickel sulfate, cobalt chloride, nickel, cobalt sulfate, colbalt aluminum oxide, cyanmocobalamin, transitional metal catalyst, tribenzoin, styrax benzoin, gum benzoin, benzoin siam absolute, fragrance, benzoin tree essential oil, myroxylyn balsamum (yusra balsam), carba mix, iodopropynyl butylcarbamate, cobalt dichloride, nickel, balsam of peru, bacitracin, phenylmercuric acetate, zinc dimethyldithiocarbamate, carbamates, acylates.carbamate copolymer, glydant plus, inulin lauryl carbamate, carbachol, di-hema trimethylhexyl dicarbamate, zinc diethyldithiocarbamate

## 2025-06-17 NOTE — ASSESSMENT & PLAN NOTE
Given her multiple allergies and allergies to certain ingredients in vaccines. All vaccinations are contraindicated. Any future vaccinations should be  approved by allergist conducted under the supervision of her Allergist

## 2025-06-17 NOTE — PROGRESS NOTES
TSH is normal but pt has low K. Will attempt to prescribe supplementation but given pt allergies she may benefit from increasing banana intake.

## 2025-07-08 ENCOUNTER — OFFICE VISIT (OUTPATIENT)
Dept: FAMILY MEDICINE | Facility: CLINIC | Age: 30
End: 2025-07-08
Payer: COMMERCIAL

## 2025-07-08 VITALS
HEART RATE: 81 BPM | DIASTOLIC BLOOD PRESSURE: 73 MMHG | SYSTOLIC BLOOD PRESSURE: 114 MMHG | BODY MASS INDEX: 25.99 KG/M2 | WEIGHT: 156 LBS | HEIGHT: 65 IN | TEMPERATURE: 98 F | RESPIRATION RATE: 18 BRPM | OXYGEN SATURATION: 98 %

## 2025-07-08 DIAGNOSIS — B95.62 INFECTION OF SKIN DUE TO METHICILLIN RESISTANT STAPHYLOCOCCUS AUREUS (MRSA): ICD-10-CM

## 2025-07-08 DIAGNOSIS — L08.9 INFECTION OF SKIN DUE TO METHICILLIN RESISTANT STAPHYLOCOCCUS AUREUS (MRSA): ICD-10-CM

## 2025-07-08 DIAGNOSIS — R50.9 FEVER, UNSPECIFIED FEVER CAUSE: Primary | ICD-10-CM

## 2025-07-08 LAB
BASOPHILS # BLD AUTO: 0.07 K/UL (ref 0–0.2)
BASOPHILS NFR BLD AUTO: 0.9 % (ref 0–1)
DIFFERENTIAL METHOD BLD: ABNORMAL
EOSINOPHIL # BLD AUTO: 0.13 K/UL (ref 0–0.5)
EOSINOPHIL NFR BLD AUTO: 1.6 % (ref 1–4)
ERYTHROCYTE [DISTWIDTH] IN BLOOD BY AUTOMATED COUNT: 12.8 % (ref 11.5–14.5)
HCT VFR BLD AUTO: 42.8 % (ref 38–47)
HGB BLD-MCNC: 14.2 G/DL (ref 12–16)
IMM GRANULOCYTES # BLD AUTO: 0.02 K/UL (ref 0–0.04)
IMM GRANULOCYTES NFR BLD: 0.2 % (ref 0–0.4)
LYMPHOCYTES # BLD AUTO: 2 K/UL (ref 1–4.8)
LYMPHOCYTES NFR BLD AUTO: 24.6 % (ref 27–41)
MCH RBC QN AUTO: 31.1 PG (ref 27–31)
MCHC RBC AUTO-ENTMCNC: 33.2 G/DL (ref 32–36)
MCV RBC AUTO: 93.9 FL (ref 80–96)
MONOCYTES # BLD AUTO: 0.68 K/UL (ref 0–0.8)
MONOCYTES NFR BLD AUTO: 8.4 % (ref 2–6)
MPC BLD CALC-MCNC: 11.6 FL (ref 9.4–12.4)
NEUTROPHILS # BLD AUTO: 5.24 K/UL (ref 1.8–7.7)
NEUTROPHILS NFR BLD AUTO: 64.3 % (ref 53–65)
NRBC # BLD AUTO: 0 X10E3/UL
NRBC, AUTO (.00): 0 %
PLATELET # BLD AUTO: 246 K/UL (ref 150–400)
RBC # BLD AUTO: 4.56 M/UL (ref 4.2–5.4)
WBC # BLD AUTO: 8.14 K/UL (ref 4.5–11)

## 2025-07-08 PROCEDURE — 99213 OFFICE O/P EST LOW 20 MIN: CPT | Mod: GC,,, | Performed by: FAMILY MEDICINE

## 2025-07-08 RX ORDER — PERMETHRIN 50 MG/G
CREAM TOPICAL
COMMUNITY
Start: 2025-06-09

## 2025-07-08 RX ORDER — DESOXIMETASONE 2.5 MG/G
OINTMENT TOPICAL 2 TIMES DAILY
COMMUNITY
Start: 2025-06-18

## 2025-07-08 RX ORDER — CLONAZEPAM 0.25 MG/1
0.25 TABLET, ORALLY DISINTEGRATING ORAL 2 TIMES DAILY
COMMUNITY
Start: 2025-06-17

## 2025-07-08 NOTE — PROGRESS NOTES
Faina Ponce MD DIANNE  [address], [place] [address]  Phone: [phone]     Subjective     Name: Cristal Smith   YOB: 1995 (29 y.o.)  MRN: [id number]  Visit Date: 7/8/2025   Chief Complaint: Follow-up (X3 weeks /Reports that she went back to for for x3 days, and contracted MRSA. Received a round of antibiotics, believes that it was clindamycin. /Wants labs to be drawn. )        HISTORY OF PRESENT ILLNESS:  Patient is a 28 y/o F who presents for follow up. Reviewed wound culture from dermatologist. It reveals MRSA. Pt has completed her course of clindamycin but reports persistent low grade fevers in the afternoons or at night. Her temperatures range from 99.5 to 100.2. F. She has had a very low temperature 95.8 F, and reported shivering and cold intolerance. She denies any night sweats, chest pain, N/V/D, abdominal pain. She reports persistent rashes, some of which are healed and has pruritus. Although she is being evaluated for anxiety and panic attacks her symptoms have not resolved with her current medications and treatments. She was visibly upset given her circumstances. Pt noticed a decreased appetite likely due to her limited options for food given her multiple allergies.         \        PAST MEDICAL HISTORY:  Significant Diagnoses - Patient  has a past medical history of Abnormal finding on urinalysis (02/05/2025), Pelvic pain (11/27/2024), and Psoriasis.  Medications - Patient has a current medication list which includes the following long-term medication(s): clonazepam, desoximetasone, and dextroamphetamine-amphetamine.   Allergies - Patient is allergic to sulfa (sulfonamide antibiotics); adhesive tape-silicones; ammoniated mercury (bulk); balsam peru; balsam peru-zinc oxide; balsam yusra; benzalkonium chloride; cananga oil (bulk); chromium and derivatives; cucumber fragrance (bulk); diphenhydramine; onofre fragrance (bulk); gloves, latex with aloe vera; haptens - fragrance  series; honey almond fragrance (bulk); lucero fragrance (bulk); kiwi fragrance (bulk); neomycin; nickel; nitrile; powder scent fragrance (bulk); shower fresh fragrance (bulk); yusra balsam; trypsin-balsam-castor oil; bacitracin; bacitracin (bulk); benzoate analogues; carbamates; cocamide analogues; cocamidopropyl analogues; dmdm hydantoin; iodopropynyl butylcarbamate; latex, natural rubber; phenylmercuric acetate (bulk); shellac; and triamcinolone acetonide.  Surgeries - Patient  has a past surgical history that includes Tonsillectomy and adenoidectomy (Bilateral).  Family History - Patient family history includes Cancer in her paternal grandmother; Diabetes in her maternal grandfather; Osteoarthritis in her maternal grandmother; Thyroid disease in her mother.      SOCIAL HISTORY:  Tobacco - Patient  reports that she has been smoking cigarettes. She has never used smokeless tobacco.   Alcohol - Patient  reports no history of alcohol use.   Recreational Drugs - Patient  reports no history of drug use.       Review of Systems   Constitutional:  Positive for fever. Negative for appetite change and night sweats.   HENT: Negative.  Negative for sore throat.    Respiratory:  Negative for shortness of breath.    Cardiovascular:  Negative for chest pain and palpitations.   Gastrointestinal:  Negative for abdominal pain, diarrhea, nausea and vomiting.   Genitourinary:  Negative for dysuria, frequency, hematuria, pelvic pain, urgency and vaginal pain.   Musculoskeletal:  Positive for arthralgias.   Integumentary:  Positive for rash.   Allergic/Immunologic: Positive for environmental allergies and food allergies.   Psychiatric/Behavioral:  The patient is nervous/anxious.           Past Medical History:   Diagnosis Date    Abnormal finding on urinalysis 02/05/2025    Pelvic pain 11/27/2024    Psoriasis         Review of patient's allergies indicates:   Allergen Reactions    Sulfa (sulfonamide antibiotics) Rash    Adhesive  tape-silicones Hives    Ammoniated mercury (bulk)     Balsam peru     Balsam peru-zinc oxide     Balsam yusra     Benzalkonium chloride     Cananga oil (bulk)     Chromium and derivatives     Cucumber fragrance (bulk)     Diphenhydramine Hives and Hallucinations     May be due to the shellac coating    Cady fragrance (bulk)     Gloves, latex with aloe vera Other (See Comments)    Haptens - fragrance series     Honey almond fragrance (bulk)     Anna fragrance (bulk)     Kiwi fragrance (bulk)     Neomycin Hives    Nickel     Nitrile Hives    Powder scent fragrance (bulk)     Shower fresh fragrance (bulk)     Yusra balsam     Trypsin-balsam-castor oil     Bacitracin Hives and Rash    Bacitracin (bulk) Blisters, Dermatitis, Hives, Itching and Rash    Benzoate analogues Anxiety, Dermatitis, Hives, Itching and Rash    Carbamates Anxiety, Blisters, Dermatitis, Hives, Itching and Rash    Cocamide analogues Blisters, Dermatitis, Hives, Itching and Rash    Cocamidopropyl analogues Blisters, Dermatitis, Hives, Itching and Rash    Dmdm hydantoin Blisters, Dermatitis, Hives, Itching and Rash    Iodopropynyl butylcarbamate Blisters, Dermatitis, Hives, Itching and Rash    Latex, natural rubber Rash    Phenylmercuric acetate (bulk) Blisters, Dermatitis, Hives, Itching and Rash    Shellac Blisters, Dermatitis, Hives, Itching and Rash    Triamcinolone acetonide Rash        Past Surgical History:   Procedure Laterality Date    TONSILLECTOMY AND ADENOIDECTOMY Bilateral         Family History   Problem Relation Name Age of Onset    Thyroid disease Mother      Osteoarthritis Maternal Grandmother      Diabetes Maternal Grandfather      Cancer Paternal Grandmother         Current Outpatient Medications   Medication Instructions    clonazePAM (KLONOPIN) 0.25 mg, 2 times daily    desoximetasone 0.25 % ointment 2 times daily    dextroamphetamine-amphetamine (ADDERALL) 20 mg tablet 1 tablet, 2 times daily    norethindrone-e.estradioL-iron  "(TAYTULLA) 1 mg-20 mcg (24)/75 mg (4) Cap 1 capsule    permethrin (ELIMITE) 5 % cream Apply topically.        Objective     /73 (BP Location: Left arm, Patient Position: Sitting)   Pulse 81   Temp 98.3 °F (36.8 °C) (Oral)   Resp 18   Ht 5' 5" (1.651 m)   Wt 70.8 kg (156 lb)   SpO2 98%   BMI 25.96 kg/m²     Physical Exam  Cardiovascular:      Rate and Rhythm: Normal rate and regular rhythm.   Pulmonary:      Effort: Pulmonary effort is normal.      Breath sounds: Normal breath sounds.   Abdominal:      General: Bowel sounds are normal.      Palpations: Abdomen is soft.   Skin:     General: Skin is warm and dry.      Findings: Rash present.      Comments: Multiple rashes of different stages of healing which are erythematous on arms, legs, lower back, upper right shoulder and posterior neck (see media)   Neurological:      Mental Status: She is alert.          All recently obtained labs have been reviewed and discussed in detail with the patient.   Assessment     1. Fever, unspecified fever cause    2. Infection of skin due to methicillin resistant Staphylococcus aureus (MRSA)         Plan     Problem List Items Addressed This Visit       Fever - Primary    Low grade fever at home 99.5--100.2F (in afternoon or evening)  Multiple rashes of different stages of healing which are erythematous on arms, legs, lower back, upper right shoulder and posterior neck (see media)   -recent cutaneous MRSA infection  -completed clindamycin regimen  -no signs of worsening of the lesions or rash  -CBC  -blood cultures   Follow up in 1 week           Other Visit Diagnoses         Infection of skin due to methicillin resistant Staphylococcus aureus (MRSA)        Relevant Orders    CBC Auto Differential    Blood culture    Blood culture              All orders:  Orders Placed This Encounter    Blood culture    Blood culture    CBC Auto Differential    CBC with Differential        Instructed patient that if symptoms fail to " improve or worsen patient should seek immediate medical attention or report to the nearest emergency department. Patient expressed verbal agreement and understanding to this plan of care.       Follow up in about 1 week (around 7/15/2025).    Faina Ponce MD, MBA  [facility]

## 2025-07-12 LAB — BACTERIA BLD CULT: NORMAL

## 2025-07-15 ENCOUNTER — OFFICE VISIT (OUTPATIENT)
Dept: FAMILY MEDICINE | Facility: CLINIC | Age: 30
End: 2025-07-15
Payer: COMMERCIAL

## 2025-07-15 VITALS
HEART RATE: 88 BPM | HEIGHT: 65 IN | SYSTOLIC BLOOD PRESSURE: 117 MMHG | TEMPERATURE: 98 F | RESPIRATION RATE: 18 BRPM | DIASTOLIC BLOOD PRESSURE: 75 MMHG | WEIGHT: 155 LBS | OXYGEN SATURATION: 97 % | BODY MASS INDEX: 25.83 KG/M2

## 2025-07-15 DIAGNOSIS — E87.6 HYPOKALEMIA: ICD-10-CM

## 2025-07-15 DIAGNOSIS — R50.9 FEVER, UNSPECIFIED FEVER CAUSE: Primary | ICD-10-CM

## 2025-07-15 LAB
ANION GAP SERPL CALCULATED.3IONS-SCNC: 14 MMOL/L (ref 7–16)
BUN SERPL-MCNC: 10 MG/DL (ref 7–19)
BUN/CREAT SERPL: 12 (ref 6–20)
CALCIUM SERPL-MCNC: 9.4 MG/DL (ref 8.4–10.2)
CHLORIDE SERPL-SCNC: 104 MMOL/L (ref 98–107)
CO2 SERPL-SCNC: 25 MMOL/L (ref 22–29)
CREAT SERPL-MCNC: 0.83 MG/DL (ref 0.55–1.02)
EGFR (NO RACE VARIABLE) (RUSH/TITUS): 99 ML/MIN/1.73M2
GLUCOSE SERPL-MCNC: 86 MG/DL (ref 74–100)
POTASSIUM SERPL-SCNC: 3.6 MMOL/L (ref 3.5–5.1)
SODIUM SERPL-SCNC: 139 MMOL/L (ref 136–145)

## 2025-07-15 NOTE — PROGRESS NOTES
Faina Ponce MD DIANNE  [address], [place] [address]  Phone: [phone]     Subjective     Name: Cristal Smith   YOB: 1995 (29 y.o.)  MRN: [id number]  Visit Date: 7/15/2025   Chief Complaint: Follow-up (X1 week )        HISTORY OF PRESENT ILLNESS:  Patient is a 30 y/o F who presents for 1 week follow up. Pt presented with low grade fevers and have low temperatures (100 F and 96 F). Has had some residual low grade fevers. Current temperature is 98.4 F.  Reviewed blood culture which  is negative and CBC reveals normal WBCs and normal eosinophils. Completed forms.             PAST MEDICAL HISTORY:  Significant Diagnoses - Patient  has a past medical history of Abnormal finding on urinalysis (02/05/2025), Pelvic pain (11/27/2024), and Psoriasis.  Medications - Patient has a current medication list which includes the following long-term medication(s): clonazepam, desoximetasone, and dextroamphetamine-amphetamine.   Allergies - Patient is allergic to sulfa (sulfonamide antibiotics); adhesive tape-silicones; ammoniated mercury (bulk); balsam peru; balsam peru-zinc oxide; balsam yusra; benzalkonium chloride; cananga oil (bulk); chromium and derivatives; cucumber fragrance (bulk); diphenhydramine; onofre fragrance (bulk); gloves, latex with aloe vera; haptens - fragrance series; honey almond fragrance (bulk); lucero fragrance (bulk); kiwi fragrance (bulk); neomycin; nickel; nitrile; powder scent fragrance (bulk); shower fresh fragrance (bulk); yusra balsam; trypsin-balsam-castor oil; bacitracin; bacitracin (bulk); benzoate analogues; carbamates; cocamide analogues; cocamidopropyl analogues; dmdm hydantoin; iodopropynyl butylcarbamate; latex, natural rubber; phenylmercuric acetate (bulk); shellac; and triamcinolone acetonide.  Surgeries - Patient  has a past surgical history that includes Tonsillectomy and adenoidectomy (Bilateral).  Family History - Patient family history includes Cancer in her  paternal grandmother; Diabetes in her maternal grandfather; Osteoarthritis in her maternal grandmother; Thyroid disease in her mother.      SOCIAL HISTORY:  Tobacco - Patient  reports that she has been smoking cigarettes. She has never used smokeless tobacco.   Alcohol - Patient  reports no history of alcohol use.   Recreational Drugs - Patient  reports no history of drug use.       Review of Systems   Constitutional:  Positive for fever. Negative for appetite change and night sweats.   HENT: Negative.  Negative for sore throat.    Respiratory:  Negative for shortness of breath.    Cardiovascular:  Positive for chest pain and palpitations.        1 episode of palpitations and chest pain   Gastrointestinal:  Negative for abdominal pain, diarrhea, nausea and vomiting.   Genitourinary:  Negative for dysuria, frequency, hematuria, pelvic pain, urgency and vaginal pain.   Musculoskeletal:  Positive for arthralgias.   Integumentary:  Positive for rash.   Allergic/Immunologic: Positive for environmental allergies and food allergies.   Psychiatric/Behavioral:  The patient is nervous/anxious.           Past Medical History:   Diagnosis Date    Abnormal finding on urinalysis 02/05/2025    Pelvic pain 11/27/2024    Psoriasis         Review of patient's allergies indicates:   Allergen Reactions    Sulfa (sulfonamide antibiotics) Rash    Adhesive tape-silicones Hives    Ammoniated mercury (bulk)     Balsam peru     Balsam peru-zinc oxide     Balsam yusra     Benzalkonium chloride     Cananga oil (bulk)     Chromium and derivatives     Cucumber fragrance (bulk)     Diphenhydramine Hives and Hallucinations     May be due to the shellac coating    Cady fragrance (bulk)     Gloves, latex with aloe vera Other (See Comments)    Haptens - fragrance series     Honey almond fragrance (bulk)     Anna fragrance (bulk)     Kiwi fragrance (bulk)     Neomycin Hives    Nickel     Nitrile Hives    Powder scent fragrance (bulk)     Shower  "fresh fragrance (bulk)     Saurav balsam     Trypsin-balsam-castor oil     Bacitracin Hives and Rash    Bacitracin (bulk) Blisters, Dermatitis, Hives, Itching and Rash    Benzoate analogues Anxiety, Dermatitis, Hives, Itching and Rash    Carbamates Anxiety, Blisters, Dermatitis, Hives, Itching and Rash    Cocamide analogues Blisters, Dermatitis, Hives, Itching and Rash    Cocamidopropyl analogues Blisters, Dermatitis, Hives, Itching and Rash    Dmdm hydantoin Blisters, Dermatitis, Hives, Itching and Rash    Iodopropynyl butylcarbamate Blisters, Dermatitis, Hives, Itching and Rash    Latex, natural rubber Rash    Phenylmercuric acetate (bulk) Blisters, Dermatitis, Hives, Itching and Rash    Shellac Blisters, Dermatitis, Hives, Itching and Rash    Triamcinolone acetonide Rash        Past Surgical History:   Procedure Laterality Date    TONSILLECTOMY AND ADENOIDECTOMY Bilateral         Family History   Problem Relation Name Age of Onset    Thyroid disease Mother      Osteoarthritis Maternal Grandmother      Diabetes Maternal Grandfather      Cancer Paternal Grandmother         Current Outpatient Medications   Medication Instructions    clonazePAM (KLONOPIN) 0.25 mg, 2 times daily    desoximetasone 0.25 % ointment 2 times daily    dextroamphetamine-amphetamine (ADDERALL) 20 mg tablet 1 tablet, 2 times daily    norethindrone-e.estradioL-iron (TAYTULLA) 1 mg-20 mcg (24)/75 mg (4) Cap 1 capsule    permethrin (ELIMITE) 5 % cream Apply topically.    potassium bicarbonate disintegrating tablet 10 mEq, Oral, 2 times daily        Objective     /75 (BP Location: Left arm, Patient Position: Sitting)   Pulse 88   Temp 98.4 °F (36.9 °C) (Oral)   Resp 18   Ht 5' 5" (1.651 m)   Wt 70.3 kg (155 lb)   SpO2 97%   BMI 25.79 kg/m²     Physical Exam  Cardiovascular:      Rate and Rhythm: Normal rate and regular rhythm.   Pulmonary:      Effort: Pulmonary effort is normal.      Breath sounds: Normal breath sounds.   Abdominal:    "   General: Bowel sounds are normal.      Palpations: Abdomen is soft.   Skin:     General: Skin is warm and dry.      Findings: Rash present.      Comments: Rash in the cubital fossa (see media)   Neurological:      Mental Status: She is alert.          All recently obtained labs have been reviewed and discussed in detail with the patient.   Assessment     1. Fever, unspecified fever cause    2. Hypokalemia         Plan     Problem List Items Addressed This Visit       Fever - Primary    Pt reports low grade fevers have still occurred but not as often  Current T 98.4 F  -CBC reveals NL WBC and eosinophils  -Blood culture is negative   Follow up in 1 month         Hypokalemia    Relevant Medications    potassium bicarbonate disintegrating tablet    Other Relevant Orders    Basic Metabolic Panel (Completed)         All orders:  Orders Placed This Encounter    Basic Metabolic Panel    potassium bicarbonate disintegrating tablet        Instructed patient that if symptoms fail to improve or worsen patient should seek immediate medical attention or report to the nearest emergency department. Patient expressed verbal agreement and understanding to this plan of care.       Follow up in about 1 month (around 8/15/2025).    Faina RODRIGUEZA   [facility]

## 2025-07-16 PROBLEM — E87.6 HYPOKALEMIA: Status: ACTIVE | Noted: 2025-07-16

## 2025-07-17 NOTE — ASSESSMENT & PLAN NOTE
Pt reports low grade fevers have still occurred but not as often  Current T 98.4 F  -CBC reveals NL WBC and eosinophils  -Blood culture is negative   Follow up in 1 month

## 2025-07-26 ENCOUNTER — OFFICE VISIT (OUTPATIENT)
Dept: FAMILY MEDICINE | Facility: CLINIC | Age: 30
End: 2025-07-26
Payer: COMMERCIAL

## 2025-07-26 VITALS
OXYGEN SATURATION: 100 % | DIASTOLIC BLOOD PRESSURE: 73 MMHG | HEART RATE: 115 BPM | HEIGHT: 65 IN | WEIGHT: 156 LBS | TEMPERATURE: 98 F | BODY MASS INDEX: 25.99 KG/M2 | RESPIRATION RATE: 20 BRPM | SYSTOLIC BLOOD PRESSURE: 110 MMHG

## 2025-07-26 DIAGNOSIS — R06.2 WHEEZE: ICD-10-CM

## 2025-07-26 DIAGNOSIS — N92.6 LATE MENSES: ICD-10-CM

## 2025-07-26 DIAGNOSIS — R21 SKIN RASH: Primary | ICD-10-CM

## 2025-07-26 PROBLEM — J32.9 RHINOSINUSITIS: Status: ACTIVE | Noted: 2025-07-26

## 2025-07-26 PROBLEM — G43.001 MIGRAINE WITHOUT AURA AND WITH STATUS MIGRAINOSUS, NOT INTRACTABLE: Status: ACTIVE | Noted: 2025-07-26

## 2025-07-26 PROBLEM — L73.9 FOLLICULITIS: Status: ACTIVE | Noted: 2025-06-05

## 2025-07-26 LAB
B-HCG UR QL: NEGATIVE
CTP QC/QA: YES

## 2025-07-26 PROCEDURE — 87070 CULTURE OTHR SPECIMN AEROBIC: CPT | Mod: ,,, | Performed by: CLINICAL MEDICAL LABORATORY

## 2025-07-26 PROCEDURE — 81025 URINE PREGNANCY TEST: CPT | Mod: QW,,, | Performed by: NURSE PRACTITIONER

## 2025-07-26 PROCEDURE — 87077 CULTURE AEROBIC IDENTIFY: CPT | Mod: ,,, | Performed by: CLINICAL MEDICAL LABORATORY

## 2025-07-26 PROCEDURE — 87186 SC STD MICRODIL/AGAR DIL: CPT | Mod: ,,, | Performed by: CLINICAL MEDICAL LABORATORY

## 2025-07-26 PROCEDURE — 99214 OFFICE O/P EST MOD 30 MIN: CPT | Mod: ,,, | Performed by: NURSE PRACTITIONER

## 2025-07-26 PROCEDURE — 99051 MED SERV EVE/WKEND/HOLIDAY: CPT | Mod: ,,, | Performed by: NURSE PRACTITIONER

## 2025-07-26 RX ORDER — METHYLPREDNISOLONE 4 MG/1
TABLET ORAL
Qty: 21 EACH | Refills: 0 | Status: SHIPPED | OUTPATIENT
Start: 2025-07-26 | End: 2025-08-16

## 2025-07-26 RX ORDER — ALBUTEROL SULFATE 90 UG/1
2 INHALANT RESPIRATORY (INHALATION) EVERY 6 HOURS PRN
Qty: 18 G | Refills: 0 | Status: SHIPPED | OUTPATIENT
Start: 2025-07-26 | End: 2026-07-26

## 2025-07-26 NOTE — PROGRESS NOTES
"Subjective:       Patient ID: Cristal Smith is a 29 y.o. female.    Chief Complaint: Rash ( To right forearm and neck, history of MRSA, contact dermatitis, an psoriasis)    Presents to clinic with pruritic skin rash.  Patient has a history of eczema and psoriasis.  She is very concerned because she has had MRSA in the past.  She would like for the wounds to be culture.  She also has some wheezing.  But she reports that she clean with some chemicals yesterday that she believes have caused her some inflammation.  She is a smoker.  She has 36 allergies on her allergy list including corticosteroids., she reports she can take Medrol Dosepak with no problems.  No other symptoms of upper respiratory infection and no fever.  Her last menstrual period was sometime in March.  She reports irregular menses after weight loss.      Review of Systems   Constitutional: Negative.    HENT: Negative.     Respiratory:  Positive for wheezing. Negative for cough, hemoptysis, sputum production and shortness of breath.    Cardiovascular: Negative.    Skin:  Positive for itching and rash.          Reviewed family, medical, surgical, and social history.    Objective:      /73 (BP Location: Right arm, Patient Position: Sitting)   Pulse (!) 115   Temp 98.2 °F (36.8 °C) (Oral)   Resp 20   Ht 5' 5" (1.651 m)   Wt 70.8 kg (156 lb)   SpO2 100%   BMI 25.96 kg/m²   Physical Exam  Vitals and nursing note reviewed.   Constitutional:       General: She is not in acute distress.     Appearance: Normal appearance. She is not ill-appearing, toxic-appearing or diaphoretic.   HENT:      Head: Normocephalic.      Right Ear: Tympanic membrane, ear canal and external ear normal.      Left Ear: Tympanic membrane, ear canal and external ear normal.      Nose: Nose normal.      Mouth/Throat:      Mouth: Mucous membranes are moist.      Pharynx: No oropharyngeal exudate or posterior oropharyngeal erythema.   Cardiovascular:      Rate and Rhythm: " Normal rate and regular rhythm.      Heart sounds: Normal heart sounds.   Pulmonary:      Effort: Pulmonary effort is normal. No respiratory distress.      Breath sounds: No stridor. Wheezing present. No rhonchi or rales.   Chest:      Chest wall: No tenderness.   Musculoskeletal:      Cervical back: Normal range of motion and neck supple.   Skin:     General: Skin is warm and dry.      Capillary Refill: Capillary refill takes less than 2 seconds.      Findings: Rash present.      Comments: Patchy, dry, sandpaper-like texture rash to flexor surfaces of both elbows right forearm and left side of neck.  No scabbing or exudate.   Neurological:      Mental Status: She is alert and oriented to person, place, and time.   Psychiatric:         Mood and Affect: Mood normal.         Behavior: Behavior normal.         Thought Content: Thought content normal.         Judgment: Judgment normal.            Office Visit on 07/26/2025   Component Date Value Ref Range Status    POC Preg Test, Ur 07/26/2025 Negative  Negative Final     Acceptable 07/26/2025 Yes   Final      Assessment:       1. Skin rash    2. Late menses    3. Wheeze        Plan:       Skin rash  -     Culture, Wound; Future; Expected date: 07/26/2025  -     methylPREDNISolone (MEDROL DOSEPACK) 4 mg tablet; use as directed  Dispense: 21 each; Refill: 0    Late menses  -     POCT urine pregnancy    Wheeze  -     X-Ray Chest PA And Lateral; Future; Expected date: 07/26/2025  -     methylPREDNISolone (MEDROL DOSEPACK) 4 mg tablet; use as directed  Dispense: 21 each; Refill: 0  -     albuterol (PROVENTIL HFA) 90 mcg/actuation inhaler; Inhale 2 puffs into the lungs every 6 (six) hours as needed for Wheezing. Rescue  Dispense: 18 g; Refill: 0    I will call with culture and x-ray results   Return to clinic as needed          Risks, benefits, and side effects were discussed with the patient. All questions were answered to the fullest satisfaction of the  patient, and pt verbalized understanding and agreement to treatment plan. Pt was to call with any new or worsening symptoms, or present to the ER.

## 2025-07-28 ENCOUNTER — TELEPHONE (OUTPATIENT)
Dept: FAMILY MEDICINE | Facility: CLINIC | Age: 30
End: 2025-07-28
Payer: COMMERCIAL

## 2025-07-28 LAB — MICROORGANISM SPEC CULT: ABNORMAL

## 2025-07-28 NOTE — TELEPHONE ENCOUNTER
----- Message from VIC Michaels sent at 7/26/2025  3:05 PM CDT -----  Notify normal chest x-ray  ----- Message -----  From: Interface, Rad Results In  Sent: 7/26/2025   3:04 PM CDT  To: VIC Michaels

## 2025-07-29 ENCOUNTER — TELEPHONE (OUTPATIENT)
Dept: FAMILY MEDICINE | Facility: CLINIC | Age: 30
End: 2025-07-29
Payer: COMMERCIAL

## 2025-07-29 ENCOUNTER — PATIENT MESSAGE (OUTPATIENT)
Dept: FAMILY MEDICINE | Facility: CLINIC | Age: 30
End: 2025-07-29
Payer: COMMERCIAL

## 2025-07-29 RX ORDER — VANCOMYCIN HYDROCHLORIDE 125 MG/1
125 CAPSULE ORAL EVERY 6 HOURS
Qty: 40 CAPSULE | Refills: 0 | Status: SHIPPED | OUTPATIENT
Start: 2025-07-29 | End: 2025-08-08

## 2025-07-29 NOTE — TELEPHONE ENCOUNTER
----- Message from VIC Michaels sent at 7/29/2025 10:33 AM CDT -----  Notify that when culture grew out MRSA. I sent in antibiotics.  ----- Message -----  From: Deedee Ch  Sent: 7/26/2025  10:40 AM CDT  To: VIC Michaels

## 2025-07-31 ENCOUNTER — OFFICE VISIT (OUTPATIENT)
Dept: OBSTETRICS AND GYNECOLOGY | Facility: CLINIC | Age: 30
End: 2025-07-31
Payer: COMMERCIAL

## 2025-07-31 VITALS
DIASTOLIC BLOOD PRESSURE: 76 MMHG | HEART RATE: 127 BPM | BODY MASS INDEX: 25.46 KG/M2 | SYSTOLIC BLOOD PRESSURE: 114 MMHG | WEIGHT: 153 LBS

## 2025-07-31 DIAGNOSIS — R10.2 PELVIC PAIN: ICD-10-CM

## 2025-07-31 PROCEDURE — 99214 OFFICE O/P EST MOD 30 MIN: CPT | Mod: PBBFAC | Performed by: STUDENT IN AN ORGANIZED HEALTH CARE EDUCATION/TRAINING PROGRAM

## 2025-07-31 PROCEDURE — 99213 OFFICE O/P EST LOW 20 MIN: CPT | Mod: S$PBB,,, | Performed by: STUDENT IN AN ORGANIZED HEALTH CARE EDUCATION/TRAINING PROGRAM

## 2025-07-31 PROCEDURE — 99999 PR PBB SHADOW E&M-EST. PATIENT-LVL IV: CPT | Mod: PBBFAC,,, | Performed by: STUDENT IN AN ORGANIZED HEALTH CARE EDUCATION/TRAINING PROGRAM

## 2025-07-31 RX ORDER — RIFAMPIN 300 MG/1
CAPSULE ORAL
COMMUNITY
Start: 2025-07-28

## 2025-08-01 NOTE — PROGRESS NOTES
Gynecology History and Physical    Assessment/Plan:   Problem List Items Addressed This Visit    None  Visit Diagnoses         Pelvic pain        Relevant Orders    US Pelvis Comp with Transvag NON-OB (xpd              CC:   Chief Complaint   Patient presents with    Pelvic Pain     Pt in for pelvic pain      HPI: Cristal Smith is a 29 y.o. female who presents with complaints of chronic pelvic pain.  She currently has a Liletta IUD placed approx 5 years ago. She was recently diagnosed with nephrocalcinosis. Reports pain started approx 2 years ago. Has pain with bladder emptying, occasional pain with intercourse.     Review of Systems: The following ROS was otherwise negative, except as noted in the HPI:  constitutional, HEENT, respiratory, cardiovascular, gastrointestinal, genitourinary, skin, musculoskeletal, neurological, psych    Gynecologic History:  history of abnormal pap smears, history of of STIs  Menstrual history:       Obstetrical History:  OB History          0    Para   0    Term   0       0    AB   0    Living   0         SAB   0    IAB   0    Ectopic   0    Multiple   0    Live Births   0                 Past Medical History:   Past Medical History:   Diagnosis Date    Abnormal finding on urinalysis 2025    Pelvic pain 2024    Psoriasis        Medications:  Medication List with Changes/Refills   Current Medications    ALBUTEROL (PROVENTIL HFA) 90 MCG/ACTUATION INHALER    Inhale 2 puffs into the lungs every 6 (six) hours as needed for Wheezing. Rescue    CLONAZEPAM (KLONOPIN) 0.25 MG TBDL    Take 0.25 mg by mouth 2 (two) times daily.    DESOXIMETASONE 0.25 % OINTMENT    Apply topically 2 (two) times daily.    DEXTROAMPHETAMINE-AMPHETAMINE (ADDERALL) 20 MG TABLET    Take 1 tablet by mouth 2 (two) times daily.    METHYLPREDNISOLONE (MEDROL DOSEPACK) 4 MG TABLET    use as directed    NORETHINDRONE-E.ESTRADIOL-IRON (TAYTULLA) 1 MG-20 MCG (24)/75 MG (4) CAP    1 capsule.     RIFAMPIN (RIFADIN) 300 MG CAPSULE        VANCOMYCIN (VANCOCIN) 125 MG CAPSULE    Take 1 capsule (125 mg total) by mouth every 6 (six) hours. for 10 days       Allergies:  Sulfa (sulfonamide antibiotics); Adhesive tape-silicones; Ammoniated mercury (bulk); Balsam peru; Balsam peru-zinc oxide; Balsam yusra; Benzalkonium chloride; Cananga oil (bulk); Chromium and derivatives; Cucumber fragrance (bulk); Diphenhydramine; Cady fragrance (bulk); Gloves, latex with aloe vera; Haptens - fragrance series; Honey almond fragrance (bulk); Anna fragrance (bulk); Kiwi fragrance (bulk); Neomycin; Nickel; Nitrile; Powder scent fragrance (bulk); Shower fresh fragrance (bulk); Yusra balsam; Trypsin-balsam-castor oil; Bacitracin; Bacitracin (bulk); Benzoate analogues; Carbamates; Cocamide analogues; Cocamidopropyl analogues; Dmdm hydantoin; Iodopropynyl butylcarbamate; Latex, natural rubber; Phenylmercuric acetate (bulk); Shellac; and Triamcinolone acetonide    Surgical History:  Past Surgical History:   Procedure Laterality Date    TONSILLECTOMY AND ADENOIDECTOMY Bilateral        Family History:  Family History   Problem Relation Name Age of Onset    Thyroid disease Mother      Osteoarthritis Maternal Grandmother      Diabetes Maternal Grandfather      Cancer Paternal Grandmother         Social History:  Social History     Substance and Sexual Activity   Alcohol Use Never     Social History     Substance and Sexual Activity   Drug Use Never     Tobacco Use History[1]    Physical Exam:  /76   Pulse (!) 127   Wt 69.4 kg (153 lb)   BMI 25.46 kg/m²     General: Alert, well appearing, no acute distress  Head: Normocephalic, atraumatic  Lungs: Unlabored respirations  Abdomen: Soft, nontender, nondistended   Pelvic: deferred  Extremities: No redness or tenderness  Skin: Well perfused, normal coloration and turgor, no lesions or rashes visualized  Neuro: Alert, oriented, normal speech, no focal deficits, moves extremities  appropriately  Osteopathic: No TART changes    Labs:  No visits with results within 1 Day(s) from this visit.   Latest known visit with results is:   Office Visit on 2025   Component Date Value    POC Preg Test, Ur 2025 Negative      Acceptab* 2025 Yes     Culture, Wound/Abscess 2025 Moderate Growth Methicillin resistant Staphylococcus aureus (A)      Return for TVUS  Discussed possibility of multifactorial etiologies for CPP  Consider work up for IC with urology  Answers submitted by the patient for this visit:  Pelvic Pain Questionnaire (Submitted on 2025)  Chief Complaint: Pelvic pain  Chronicity: chronic  Onset: more than 1 year ago  Frequency: intermittently  Progression since onset: unchanged  Pain severity: moderate  Affected side: Middle  Pregnant now?: No  abdominal pain: Yes  anorexia: Yes  back pain: Yes  chills: Yes  constipation: No  diarrhea: No  discolored urine: Yes  dysuria: Yes  fever: Yes  flank pain: Yes  frequency: Yes  headaches: No  hematuria: Yes  nausea: No  painful intercourse: Yes  rash: Yes  urgency: Yes  vomiting: No  Aggravated by: activity, intercourse, tactile pressure, urinating, menstrual cycle  treatments tried: acetaminophen, antibiotics, anti-fungal cream, douching, NSAIDs, warm bath  Improvement on treatment: no relief  Sexual activity: sexually active  Partner with STD symptoms: no  Birth control: an IUD  Menstrual history: irregular  STD: Yes  abdominal surgery: No   section: No  Ectopic pregnancy: No  Endometriosis: No  herpes simplex: Yes  gynecological surgery: No  menorrhagia: No  metrorrhagia: Yes  miscarriage: No  ovarian cysts: No  perineal abscess: No  PID: No  terminated pregnancy: No  vaginosis: No         [1]   Social History  Tobacco Use   Smoking Status Every Day    Current packs/day: 1.00    Types: Cigarettes   Smokeless Tobacco Never   Tobacco Comments    States that she is trying to quit.

## 2025-08-14 ENCOUNTER — HOSPITAL ENCOUNTER (OUTPATIENT)
Dept: RADIOLOGY | Facility: HOSPITAL | Age: 30
Discharge: HOME | End: 2025-08-14
Attending: STUDENT IN AN ORGANIZED HEALTH CARE EDUCATION/TRAINING PROGRAM
Payer: COMMERCIAL

## 2025-08-14 DIAGNOSIS — R10.2 PELVIC PAIN: ICD-10-CM

## 2025-08-14 PROCEDURE — 76856 US EXAM PELVIC COMPLETE: CPT | Mod: 26,,, | Performed by: RADIOLOGY

## 2025-08-14 PROCEDURE — 76856 US EXAM PELVIC COMPLETE: CPT | Mod: TC

## 2025-08-14 PROCEDURE — 76830 TRANSVAGINAL US NON-OB: CPT | Mod: 26,,, | Performed by: RADIOLOGY

## 2025-08-19 ENCOUNTER — OFFICE VISIT (OUTPATIENT)
Dept: FAMILY MEDICINE | Facility: CLINIC | Age: 30
End: 2025-08-19
Payer: COMMERCIAL

## 2025-08-19 VITALS
BODY MASS INDEX: 26.16 KG/M2 | DIASTOLIC BLOOD PRESSURE: 79 MMHG | HEART RATE: 120 BPM | HEIGHT: 65 IN | TEMPERATURE: 99 F | RESPIRATION RATE: 16 BRPM | SYSTOLIC BLOOD PRESSURE: 117 MMHG | OXYGEN SATURATION: 98 % | WEIGHT: 157 LBS

## 2025-08-19 DIAGNOSIS — L08.9 INFECTION OF SKIN DUE TO METHICILLIN RESISTANT STAPHYLOCOCCUS AUREUS (MRSA): Primary | ICD-10-CM

## 2025-08-19 DIAGNOSIS — B95.62 INFECTION OF SKIN DUE TO METHICILLIN RESISTANT STAPHYLOCOCCUS AUREUS (MRSA): Primary | ICD-10-CM

## 2025-08-19 RX ORDER — MUPIROCIN 20 MG/G
OINTMENT TOPICAL
COMMUNITY
Start: 2025-08-04

## 2025-08-20 PROBLEM — L08.9 INFECTION OF SKIN DUE TO METHICILLIN RESISTANT STAPHYLOCOCCUS AUREUS (MRSA): Status: ACTIVE | Noted: 2025-08-20

## 2025-08-20 PROBLEM — B95.62 INFECTION OF SKIN DUE TO METHICILLIN RESISTANT STAPHYLOCOCCUS AUREUS (MRSA): Status: ACTIVE | Noted: 2025-08-20

## 2025-08-21 LAB — MICROORGANISM SPEC CULT: NORMAL

## 2025-08-25 ENCOUNTER — PATIENT MESSAGE (OUTPATIENT)
Facility: HOSPITAL | Age: 30
End: 2025-08-25
Payer: COMMERCIAL